# Patient Record
Sex: FEMALE | Race: WHITE | NOT HISPANIC OR LATINO | Employment: UNEMPLOYED | URBAN - METROPOLITAN AREA
[De-identification: names, ages, dates, MRNs, and addresses within clinical notes are randomized per-mention and may not be internally consistent; named-entity substitution may affect disease eponyms.]

---
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
## 2024-01-15 ENCOUNTER — REFERRAL TRIAGE (OUTPATIENT)
Dept: LABOR AND DELIVERY | Facility: HOSPITAL | Age: 19
End: 2024-01-15
Payer: MEDICAID

## 2024-01-15 ENCOUNTER — PATIENT OUTREACH (OUTPATIENT)
Dept: LABOR AND DELIVERY | Facility: HOSPITAL | Age: 19
End: 2024-01-15
Payer: MEDICAID

## 2024-01-16 ENCOUNTER — PATIENT OUTREACH (OUTPATIENT)
Dept: LABOR AND DELIVERY | Facility: HOSPITAL | Age: 19
End: 2024-01-16
Payer: MEDICAID

## 2024-01-16 NOTE — OUTREACH NOTE
[facility]  Unable to Reach       Questions/Answers      Flowsheet Row Responses   Call Attempt Second   Outcome Left message, MyChart message sent to patient   Next Call Attempt Date 01/17/24                Cinthya Burton RN  Maternity Nurse Navigator    1/16/2024, 18:32 EST

## 2024-01-17 ENCOUNTER — PATIENT OUTREACH (OUTPATIENT)
Dept: LABOR AND DELIVERY | Facility: HOSPITAL | Age: 19
End: 2024-01-17
Payer: MEDICAID

## 2024-01-17 NOTE — OUTREACH NOTE
[facility]  Intake    Current Estimated Gestational Age: 32w1d    Intake Assessment      Flowsheet Row Responses   Best Method for Contacting Cell   Currently Employed No  [was working seasonal at [employer] but layed off and now staying home with 2 yr old]   Do you have a dentist? No  [discussed calling insurance for dental providers and calling to make healthy check up]   Resources Presently Utilizing: None   Maternal Warning Signs Provided  [sent in [employer]]   Other: Provided  [sent in [employer]]   Other Education How to find a dentist, Insurance benefits/Incentives, Smoking/Vaping Cessation, WIC Benefits, SNAP Benefits            Learning Assessment      Flowsheet Row Responses   Relationship Patient   Learner Name Tracy   Does the learner have any barriers to learning? No Barriers   What is the preferred language of the learner for medical teaching? English   Is an  required? No   How does the learner prefer to learn new concepts? Listening, Reading, Demonstration, Pictures/Video        Info sent for smoking cessation, she reports she has cut back to 3 cig/day    Tobacco, Alcohol, and Drug History     reports that she has been smoking cigarettes. She has a 1.25 pack-year smoking history. She has never been exposed to tobacco smoke. She has never used smokeless tobacco.   reports that she does not currently use alcohol.   reports that she does not currently use drugs after having used the following drugs: Marijuana.    Cinthya Burton RN  Maternity Nurse Navigator    1/17/2024, 12:01 EST

## 2024-01-17 NOTE — OUTREACH NOTE
[facility]  Enrollment    Current Estimated Gestational Age: 32w1d    Questions/Answers      Flowsheet Row Responses   Would like to participate? Yes   Date of Intake Visit 01/17/24            Contact info provided    Cinthya Burton RN  Maternity Nurse Navigator    1/17/2024, 12:03 EST

## 2024-02-15 ENCOUNTER — PATIENT OUTREACH (OUTPATIENT)
Dept: LABOR AND DELIVERY | Facility: HOSPITAL | Age: 19
End: 2024-02-15
Payer: MEDICAID

## 2024-03-19 ENCOUNTER — PATIENT OUTREACH (OUTPATIENT)
Dept: LABOR AND DELIVERY | Facility: HOSPITAL | Age: 19
End: 2024-03-19
Payer: MEDICAID

## 2024-03-19 NOTE — OUTREACH NOTE
[facility]  Unable to Reach       Questions/Answers      Flowsheet Row Responses   Pending Outreach Prenatal Check-in   Call Attempt First   Outcome Left message, [employer] message sent to patient                Cinthya Burton RN  Maternity Nurse Navigator    3/19/2024, 15:59 EDT

## 2024-03-25 ENCOUNTER — PATIENT OUTREACH (OUTPATIENT)
Dept: LABOR AND DELIVERY | Facility: HOSPITAL | Age: 19
End: 2024-03-25
Payer: MEDICAID

## 2024-03-25 NOTE — OUTREACH NOTE
[facility]  Unable to Reach       Questions/Answers      Flowsheet Row Responses   Pending Outreach Prenatal Check-in   Call Attempt Second   Outcome No answer/busy, MyChart message sent to patient   Unable to reach comments: mailbox full unable to leave voicemail            Contacted Dr Lomax office if patient had transferred, no notes of delivery and unable to reach patient.    Isha at office reports patient last seen in office 2/15/24    Cinthya Burton RN  Maternity Nurse Navigator    3/25/2024, 14:51 EDT

## 2024-04-10 ENCOUNTER — PATIENT OUTREACH (OUTPATIENT)
Dept: LABOR AND DELIVERY | Facility: HOSPITAL | Age: 19
End: 2024-04-10
Payer: MEDICAID

## 2024-04-10 NOTE — OUTREACH NOTE
[facility]  Unable to Reach       Questions/Answers      Flowsheet Row Responses   Pending Outreach Postpartum Check-in   Call Attempt Third   Outcome Left message, MyChart message sent to patient            Unable to reach patient, no delivery note in chart.      Cinthya Burton RN  Maternity Nurse Navigator    4/10/2024, 13:35 EDT

## 2024-08-19 ENCOUNTER — HOSPITAL ENCOUNTER (EMERGENCY)
Facility: HOSPITAL | Age: 19
Discharge: HOME | End: 2024-08-19
Attending: EMERGENCY MEDICINE
Payer: MEDICAID

## 2024-08-19 ENCOUNTER — APPOINTMENT (OUTPATIENT)
Dept: ULTRASOUND IMAGING | Facility: HOSPITAL | Age: 19
End: 2024-08-19
Payer: MEDICAID

## 2024-08-19 VITALS
HEART RATE: 102 BPM | TEMPERATURE: 97.8 F | DIASTOLIC BLOOD PRESSURE: 87 MMHG | OXYGEN SATURATION: 98 % | SYSTOLIC BLOOD PRESSURE: 147 MMHG | WEIGHT: 203 LBS | BODY MASS INDEX: 34.66 KG/M2 | RESPIRATION RATE: 17 BRPM | HEIGHT: 64 IN

## 2024-08-19 DIAGNOSIS — N93.9 ABNORMAL VAGINAL BLEEDING: Primary | ICD-10-CM

## 2024-08-19 LAB
B-HCG UR QL: NEGATIVE
BASOPHILS # BLD AUTO: 0.04 10*3/MM3 (ref 0–0.2)
BASOPHILS NFR BLD AUTO: 0.3 % (ref 0–1.5)
BILIRUB UR QL STRIP: NEGATIVE
CLARITY UR: ABNORMAL
COLOR UR: ABNORMAL
DEPRECATED RDW RBC AUTO: 60.2 FL (ref 37–54)
EOSINOPHIL # BLD AUTO: 0.11 10*3/MM3 (ref 0–0.4)
EOSINOPHIL NFR BLD AUTO: 0.9 % (ref 0.3–6.2)
ERYTHROCYTE [DISTWIDTH] IN BLOOD BY AUTOMATED COUNT: 17.7 % (ref 12.3–15.4)
GLUCOSE UR STRIP-MCNC: NEGATIVE MG/DL
HCT VFR BLD AUTO: 40.1 % (ref 34–46.6)
HGB BLD-MCNC: 12.2 G/DL (ref 12–15.9)
HGB UR QL STRIP.AUTO: ABNORMAL
IMM GRANULOCYTES # BLD AUTO: 0.03 10*3/MM3 (ref 0–0.05)
IMM GRANULOCYTES NFR BLD AUTO: 0.3 % (ref 0–0.5)
KETONES UR QL STRIP: ABNORMAL
LEUKOCYTE ESTERASE UR QL STRIP.AUTO: NEGATIVE
LYMPHOCYTES # BLD AUTO: 2.48 10*3/MM3 (ref 0.7–3.1)
LYMPHOCYTES NFR BLD AUTO: 20.8 % (ref 19.6–45.3)
MCH RBC QN AUTO: 28 PG (ref 26.6–33)
MCHC RBC AUTO-ENTMCNC: 30.4 G/DL (ref 31.5–35.7)
MCV RBC AUTO: 92 FL (ref 79–97)
MONOCYTES # BLD AUTO: 0.75 10*3/MM3 (ref 0.1–0.9)
MONOCYTES NFR BLD AUTO: 6.3 % (ref 5–12)
NEUTROPHILS NFR BLD AUTO: 71.4 % (ref 42.7–76)
NEUTROPHILS NFR BLD AUTO: 8.51 10*3/MM3 (ref 1.7–7)
NITRITE UR QL STRIP: NEGATIVE
PH UR STRIP.AUTO: 5.5 [PH] (ref 5–8)
PLATELET # BLD AUTO: 358 10*3/MM3 (ref 140–450)
PMV BLD AUTO: 8.3 FL (ref 6–12)
PROT UR QL STRIP: ABNORMAL
RBC # BLD AUTO: 4.36 10*6/MM3 (ref 3.77–5.28)
SP GR UR STRIP: >=1.03 (ref 1–1.03)
UROBILINOGEN UR QL STRIP: ABNORMAL
WBC NRBC COR # BLD AUTO: 11.92 10*3/MM3 (ref 3.4–10.8)

## 2024-08-19 PROCEDURE — 81025 URINE PREGNANCY TEST: CPT | Performed by: EMERGENCY MEDICINE

## 2024-08-19 PROCEDURE — 99283 EMERGENCY DEPT VISIT LOW MDM: CPT

## 2024-08-19 PROCEDURE — 87798 DETECT AGENT NOS DNA AMP: CPT

## 2024-08-19 PROCEDURE — 81003 URINALYSIS AUTO W/O SCOPE: CPT | Performed by: EMERGENCY MEDICINE

## 2024-08-19 PROCEDURE — 87801 DETECT AGNT MULT DNA AMPLI: CPT

## 2024-08-19 PROCEDURE — 76830 TRANSVAGINAL US NON-OB: CPT

## 2024-08-19 PROCEDURE — 87491 CHLMYD TRACH DNA AMP PROBE: CPT

## 2024-08-19 PROCEDURE — 99284 EMERGENCY DEPT VISIT MOD MDM: CPT

## 2024-08-19 PROCEDURE — 93976 VASCULAR STUDY: CPT

## 2024-08-19 PROCEDURE — 85025 COMPLETE CBC W/AUTO DIFF WBC: CPT

## 2024-08-19 PROCEDURE — 87661 TRICHOMONAS VAGINALIS AMPLIF: CPT

## 2024-08-19 PROCEDURE — 87591 N.GONORRHOEAE DNA AMP PROB: CPT

## 2024-08-19 PROCEDURE — 80053 COMPREHEN METABOLIC PANEL: CPT

## 2024-08-19 RX ORDER — SODIUM CHLORIDE 0.9 % (FLUSH) 0.9 %
10 SYRINGE (ML) INJECTION AS NEEDED
Status: DISCONTINUED | OUTPATIENT
Start: 2024-08-19 | End: 2024-08-19 | Stop reason: HOSPADM

## 2024-08-20 LAB
ALBUMIN SERPL-MCNC: 4.3 G/DL (ref 3.5–5.2)
ALBUMIN/GLOB SERPL: 1.4 G/DL
ALP SERPL-CCNC: 126 U/L (ref 39–117)
ALT SERPL W P-5'-P-CCNC: 13 U/L (ref 1–33)
ANION GAP SERPL CALCULATED.3IONS-SCNC: 10.8 MMOL/L (ref 5–15)
AST SERPL-CCNC: 14 U/L (ref 1–32)
BILIRUB SERPL-MCNC: 0.4 MG/DL (ref 0–1.2)
BUN SERPL-MCNC: 11 MG/DL (ref 6–20)
BUN/CREAT SERPL: 13.9 (ref 7–25)
CALCIUM SPEC-SCNC: 9.4 MG/DL (ref 8.6–10.5)
CHLORIDE SERPL-SCNC: 103 MMOL/L (ref 98–107)
CO2 SERPL-SCNC: 21.2 MMOL/L (ref 22–29)
CREAT SERPL-MCNC: 0.79 MG/DL (ref 0.57–1)
EGFRCR SERPLBLD CKD-EPI 2021: 109.3 ML/MIN/1.73
GLOBULIN UR ELPH-MCNC: 3 GM/DL
GLUCOSE SERPL-MCNC: 92 MG/DL (ref 65–99)
POTASSIUM SERPL-SCNC: 3.6 MMOL/L (ref 3.5–5.2)
PROT SERPL-MCNC: 7.3 G/DL (ref 6–8.5)
SODIUM SERPL-SCNC: 135 MMOL/L (ref 136–145)

## 2024-08-20 NOTE — FSED PROVIDER NOTE
[facility] ED / URGENT CARE    EMERGENCY DEPARTMENT ENCOUNTER    Room Number:  04/04  Date seen:  8/19/2024  Time seen: 20:31 EDT  PCP: Provider, No Known  Historian: Patient    HPI:  Chief complaint: Vaginal bleeding  Context:Tracy Carrington is a 19 y.o. female who presents to the ED with c/o vaginal bleeding.  Patient reports that she has been having vaginal bleeding over the last 6 days.  She reports that she had a normal period at the beginning of the month and then stop bleeding for 3 days.  She reports that she has been passing small size clots as well.  Patient denies any birth control use or concern for pregnancy.  Patient is nontoxic in appearance.  She denies any fever, urinary symptoms.  Patient denies any abdominal pain.    Timing: Constant  Duration: 6 days  Intensity/Severity: Moderate  Associated Symptoms: Vaginal bleeding  Aggravating Factors: No known aggravating  Alleviating Factors: No known alleviating      MEDICAL RECORD REVIEW  Seasonal allergies, anxiety, depression, ADHD, asthma    ALLERGIES  Dairy aid  [tilactase]    PAST MEDICAL HISTORY  Active Ambulatory Problems     Diagnosis Date Noted    Sprain of unspecified ligament of right ankle, initial encounter 08/20/2018    Unspecified sprain of right foot, initial encounter 11/29/2018    Adverse effect of unspecified drugs, medicaments and biological substances, initial encounter 04/25/2018    Arthralgia 12/31/2018    Bitten by dog 04/26/2018    Abdominal pain 01/24/2018    Chest pain 10/06/2017    Conjunctivitis 04/23/2012    Contact dermatitis 09/07/2017    Diarrhea 03/07/2018    Dizziness 05/14/2013    Vertigo 11/22/2017    Elevated blood pressure reading without diagnosis of hypertension 09/19/2017    Encounter for lipid screening for cardiovascular disease 11/22/2017    Encounter for childhood immunizations appropriate for age 02/03/2017    Exercise-induced asthma 03/12/2019    Family history of diabetes mellitus  05/14/2013    Foot pain, right 08/12/2018    Genital herpes simplex 04/06/2019    Hay fever 04/23/2012    Heart murmur 11/14/2017    Knee pain, bilateral 04/30/2019    Laceration without foreign body of left hand, initial encounter 04/26/2018    Menorrhagia 03/26/2018    Menstrual irregularity 05/22/2019    Mixed anxiety depressive disorder 03/26/2018    Anxiety disorder 03/26/2018    Depression 03/26/2018    Nausea 09/19/2017    Overweight 01/30/2018    Viral intestinal infection 09/10/2018    Memory loss 11/09/2022    Closed head injury with concussion 11/09/2022    Attention deficit hyperactivity disorder (ADHD), combined type 11/09/2022     Resolved Ambulatory Problems     Diagnosis Date Noted    No Resolved Ambulatory Problems     Past Medical History:   Diagnosis Date    ADHD (attention deficit hyperactivity disorder)     Asthma     Seasonal allergies        PAST SURGICAL HISTORY  Past Surgical History:   Procedure Laterality Date    EAR TUBES      ENDOSCOPY      TONSILLECTOMY         FAMILY HISTORY  Family History   Problem Relation Age of Onset    Diabetes Mother     Hypertension Mother     Diabetes Father     Hypertension Father     Diabetes Maternal Grandmother     Hypertension Maternal Grandmother     Diabetes Paternal Grandmother     Hypertension Paternal Grandfather     Leukemia Paternal Grandfather        SOCIAL HISTORY  Social History     Socioeconomic History    Marital status: Significant Other     Spouse name: Alejandro Faustin    Number of children: 1   Tobacco Use    Smoking status: Every Day     Current packs/day: 0.25     Average packs/day: 0.3 packs/day for 5.0 years (1.3 ttl pk-yrs)     Types: Cigarettes     Passive exposure: Never    Smokeless tobacco: Never    Tobacco comments:     Quit E Cig- middle of 2019/ Smokes 3 cigs per day   Vaping Use    Vaping status: Former   Substance and Sexual Activity    Alcohol use: Not Currently    Drug use: Not Currently     Types: Marijuana     Comment:  quit with +UPT    Sexual activity: Defer       REVIEW OF SYSTEMS  Review of Systems    All systems reviewed and negative except for those discussed in HPI.     PHYSICAL EXAM    I have reviewed the triage vital signs and nursing notes.    ED Triage Vitals   Temp Heart Rate Resp BP SpO2   08/19/24 1752 08/19/24 1752 08/19/24 1752 08/19/24 1752 08/19/24 1752   97.8 °F (36.6 °C) 84 16 (!) 175/108 99 %      Temp src Heart Rate Source Patient Position BP Location FiO2 (%)   -- -- 08/19/24 1932 08/19/24 1932 --     Lying Left arm        Physical Exam  Exam conducted with a chaperone present.   Constitutional:       Appearance: Normal appearance. She is not toxic-appearing.   HENT:      Nose: Nose normal.      Mouth/Throat:      Mouth: Mucous membranes are moist.   Eyes:      Pupils: Pupils are equal, round, and reactive to light.   Cardiovascular:      Rate and Rhythm: Normal rate and regular rhythm.      Pulses: Normal pulses.      Heart sounds: Normal heart sounds.   Pulmonary:      Effort: Pulmonary effort is normal.      Breath sounds: Normal breath sounds.   Abdominal:      General: Bowel sounds are normal.      Palpations: Abdomen is soft.      Tenderness: There is no abdominal tenderness. There is no guarding or rebound.   Genitourinary:     Vagina: Bleeding present.      Cervix: Normal.      Uterus: Normal.    Musculoskeletal:         General: Normal range of motion.   Skin:     General: Skin is warm.   Neurological:      General: No focal deficit present.      Mental Status: She is alert.   Psychiatric:         Mood and Affect: Mood normal.         Behavior: Behavior normal.         Vital signs and nursing notes reviewed.        LAB RESULTS  Recent Results (from the past 24 hour(s))   Pregnancy, Urine - Urine, Clean Catch    Collection Time: 08/19/24  7:10 PM    Specimen: Urine, Clean Catch   Result Value Ref Range    HCG, Urine QL Negative Negative   Urinalysis without microscopic (no culture) - Urine, Clean  Catch    Collection Time: 08/19/24  7:10 PM    Specimen: Urine, Clean Catch   Result Value Ref Range    Color, UA Orange (A) Yellow, Straw    Appearance, UA Cloudy (A) Clear    pH, UA 5.5 5.0 - 8.0    Specific Gravity, UA >=1.030 1.005 - 1.030    Glucose, UA Negative Negative    Ketones, UA Trace (A) Negative    Bilirubin, UA Negative Negative    Blood, UA Large (3+) (A) Negative    Protein, UA 30 mg/dL (1+) (A) Negative    Leuk Esterase, UA Negative Negative    Nitrite, UA Negative Negative    Urobilinogen, UA 0.2 E.U./dL 0.2 - 1.0 E.U./dL   Comprehensive Metabolic Panel    Collection Time: 08/19/24  7:17 PM    Specimen: Blood   Result Value Ref Range    Glucose 92 65 - 99 mg/dL    BUN 11 6 - 20 mg/dL    Creatinine 0.79 0.57 - 1.00 mg/dL    Sodium 135 (L) 136 - 145 mmol/L    Potassium 3.6 3.5 - 5.2 mmol/L    Chloride 103 98 - 107 mmol/L    CO2 18.9 (L) 22.0 - 29.0 mmol/L    Calcium 9.4 8.6 - 10.5 mg/dL    Total Protein 7.3 6.0 - 8.5 g/dL    Albumin 4.3 3.5 - 5.2 g/dL    ALT (SGPT) 13 1 - 33 U/L    AST (SGOT) 14 1 - 32 U/L    Alkaline Phosphatase 126 (H) 39 - 117 U/L    Total Bilirubin 0.4 0.0 - 1.2 mg/dL    Globulin 3.0 gm/dL    A/G Ratio 1.4 g/dL    BUN/Creatinine Ratio 13.9 7.0 - 25.0    Anion Gap 13.1 5.0 - 15.0 mmol/L    eGFR 109.3 >60.0 mL/min/1.73   CBC Auto Differential    Collection Time: 08/19/24  7:17 PM    Specimen: Blood   Result Value Ref Range    WBC 11.92 (H) 3.40 - 10.80 10*3/mm3    RBC 4.36 3.77 - 5.28 10*6/mm3    Hemoglobin 12.2 12.0 - 15.9 g/dL    Hematocrit 40.1 34.0 - 46.6 %    MCV 92.0 79.0 - 97.0 fL    MCH 28.0 26.6 - 33.0 pg    MCHC 30.4 (L) 31.5 - 35.7 g/dL    RDW 17.7 (H) 12.3 - 15.4 %    RDW-SD 60.2 (H) 37.0 - 54.0 fl    MPV 8.3 6.0 - 12.0 fL    Platelets 358 140 - 450 10*3/mm3    Neutrophil % 71.4 42.7 - 76.0 %    Lymphocyte % 20.8 19.6 - 45.3 %    Monocyte % 6.3 5.0 - 12.0 %    Eosinophil % 0.9 0.3 - 6.2 %    Basophil % 0.3 0.0 - 1.5 %    Immature Grans % 0.3 0.0 - 0.5 %    Neutrophils,  Absolute 8.51 (H) 1.70 - 7.00 10*3/mm3    Lymphocytes, Absolute 2.48 0.70 - 3.10 10*3/mm3    Monocytes, Absolute 0.75 0.10 - 0.90 10*3/mm3    Eosinophils, Absolute 0.11 0.00 - 0.40 10*3/mm3    Basophils, Absolute 0.04 0.00 - 0.20 10*3/mm3    Immature Grans, Absolute 0.03 0.00 - 0.05 10*3/mm3       Ordered the above labs and independently reviewed the results.      RADIOLOGY RESULTS  US Non-ob Transvaginal    Result Date: 8/19/2024  US NON-OB TRANSVAGINAL Date of Exam: 8/19/2024 7:55 PM EDT Indication: abnormal vaginal bleeding. Comparison: None for this pregnancy Technique: Transvaginal pelvic ultrasound was performed.  Grayscale and color Doppler imaging was utilized to evaluate the pelvic area with image documentation per protocol.  Doppler spectral analysis was performed. Findings: Uterus: A trace amount of fluid is noted within the endometrial canal/cervical canal. No gestational sac, yolk sac or fetal pole is identified on this study. The myometrial echotexture is unremarkable. No focal mass. The cervix is grossly unremarkable. The endometrium measures approximately 0.7 cm in thickness The right ovary is visualized on this study with color and Doppler normal flow. A 1.8 x 1.5 x 1.2 cm cyst is noted on the right ovary, possibly representing a follicle versus corpus luteum. The left ovary is not visible on this study.     Impression: No intrauterine pregnancy is identified on this study. A trace amount of fluid is noted within the endometrial canal. The right ovary is unremarkable. The left ovary is not visible on this study Electronically Signed: Colton Zambrano DO  8/19/2024 8:21 PM EDT  Workstation ID: [id number]        I ordered the above noted radiological studies. Independently reviewed by me and discussed with radiologist.  See dictation above for official radiology interpretation.      Orders placed during this visit:  Orders Placed This Encounter   Procedures    NuSwab VG+ - Swab, Vagina    US Non-ob  Transvaginal    Pregnancy, Urine - Urine, Clean Catch    Urinalysis without microscopic (no culture) - Urine, Clean Catch    Comprehensive Metabolic Panel    CBC Auto Differential    NPO Diet NPO Type: Strict NPO    Undress & Gown    Vital Signs    Orthostatic Blood Pressure    Oxygen Therapy- Nasal Cannula; Titrate 1-6 LPM Per SpO2; 90 - 95%    Insert Peripheral IV    CBC & Differential    ED Acknowledgement Form Needed;           PROCEDURES    Procedures        MEDICATIONS GIVEN IN ER    Medications   sodium chloride 0.9 % flush 10 mL (has no administration in time range)         PROGRESS, DATA ANALYSIS, CONSULTS, AND MEDICAL DECISION MAKING    All labs and radiology studies have been independently reviewed by me.          AS OF 21:52 EDT VITALS:    BP - 147/87  HR - 102  TEMP - 97.8 °F (36.6 °C)  02 SATS - 98%    Medical Decision Making  MEDICAL DECISION  Patient is a 19-year-old female who presents today with vaginal bleeding.  Patient had an IV established and blood requested time.  Patient presents with vaginal bleeding likely secondary to fibroids or other non-emergent cause of abnormal uterine bleeding such as anovulatory cycle. Based on history and exam workup patient's presentation not consistent with ectopic pregnancy, molar pregnancy, life-threatening coagulopathy, trauma, serious bacterial infection.  Patient's ultrasound was normal.  Pelvic exam was completed and patient did request STD testing to be added.  Recommend follow-up with her OB/GYN for continued evaluation.  She was given strict return precautions with understanding.        Problems Addressed:  Abnormal vaginal bleeding: complicated acute illness or injury    Amount and/or Complexity of Data Reviewed  Labs: ordered.  Radiology: ordered.    Risk  Prescription drug management.          DIAGNOSIS  Final diagnoses:   Abnormal vaginal bleeding       New Medications Ordered This Visit   Medications    sodium chloride 0.9 % flush 10 mL           I  performed hand hygiene on entry into the pt room and upon exit.      Part of this note may be an electronic transcription/translation of spoken language to printed text using the Dragon Dictation System.     Appropriate PPE worn during exam.    Dictated utilizing Dragon dictation     Note Disclaimer: At [facility], we believe that sharing information builds trust and better relationships. You are receiving this note because you recently visited [facility]. It is possible you will see health information before a provider has talked with you about it. This kind of information can be easy to misunderstand. To help you fully understand what it means for your health, we urge you to discuss this note with your provider.

## 2024-08-20 NOTE — DISCHARGE INSTRUCTIONS
Thank you for letting us care for you today.  Please follow-up with your primary care provider and your OB/GYN as previously discussed.  We will call you if any of your testing comes back as positive.  If you do not hear from us these results will be on your MyChart.  Make sure you are drinking plenty of fluids.  Return to the emergency room for any worsening vaginal bleeding, fever, pain or any other concerning symptoms.

## 2024-08-22 LAB
A VAGINAE DNA VAG QL NAA+PROBE: ABNORMAL SCORE
BVAB2 DNA VAG QL NAA+PROBE: ABNORMAL SCORE
C ALBICANS DNA VAG QL NAA+PROBE: NEGATIVE
C GLABRATA DNA VAG QL NAA+PROBE: NEGATIVE
C TRACH DNA SPEC QL NAA+PROBE: NEGATIVE
MEGA1 DNA VAG QL NAA+PROBE: ABNORMAL SCORE
N GONORRHOEA DNA VAG QL NAA+PROBE: NEGATIVE
T VAGINALIS DNA VAG QL NAA+PROBE: NEGATIVE

## 2024-08-23 RX ORDER — METRONIDAZOLE 500 MG/1
500 TABLET ORAL 2 TIMES DAILY
Qty: 14 TABLET | Refills: 0 | Status: SHIPPED | OUTPATIENT
Start: 2024-08-23 | End: 2024-08-30

## 2024-10-09 ENCOUNTER — HOSPITAL ENCOUNTER (OUTPATIENT)
Facility: HOSPITAL | Age: 19
Discharge: HOME | End: 2024-10-09
Attending: EMERGENCY MEDICINE | Admitting: EMERGENCY MEDICINE
Payer: MEDICAID

## 2024-10-09 VITALS
TEMPERATURE: 98.2 F | RESPIRATION RATE: 18 BRPM | HEART RATE: 110 BPM | WEIGHT: 200 LBS | SYSTOLIC BLOOD PRESSURE: 151 MMHG | OXYGEN SATURATION: 100 % | HEIGHT: 63 IN | DIASTOLIC BLOOD PRESSURE: 89 MMHG | BODY MASS INDEX: 35.44 KG/M2

## 2024-10-09 DIAGNOSIS — Z3A.01 LESS THAN 8 WEEKS GESTATION OF PREGNANCY: ICD-10-CM

## 2024-10-09 DIAGNOSIS — J02.0 STREP THROAT: Primary | ICD-10-CM

## 2024-10-09 LAB
B-HCG UR QL: POSITIVE
FLUAV SUBTYP SPEC NAA+PROBE: NOT DETECTED
FLUBV RNA ISLT QL NAA+PROBE: NOT DETECTED
SARS-COV-2 RNA RESP QL NAA+PROBE: NOT DETECTED
STREP A PCR: DETECTED

## 2024-10-09 PROCEDURE — 87651 STREP A DNA AMP PROBE: CPT | Performed by: EMERGENCY MEDICINE

## 2024-10-09 PROCEDURE — 87636 SARSCOV2 & INF A&B AMP PRB: CPT | Performed by: EMERGENCY MEDICINE

## 2024-10-09 PROCEDURE — 63710000001 ONDANSETRON ODT 4 MG TABLET DISPERSIBLE: Performed by: NURSE PRACTITIONER

## 2024-10-09 PROCEDURE — G0463 HOSPITAL OUTPT CLINIC VISIT: HCPCS | Performed by: NURSE PRACTITIONER

## 2024-10-09 PROCEDURE — 81025 URINE PREGNANCY TEST: CPT | Performed by: NURSE PRACTITIONER

## 2024-10-09 RX ORDER — AMOXICILLIN 500 MG/1
500 CAPSULE ORAL 3 TIMES DAILY
Qty: 30 CAPSULE | Refills: 0 | Status: SHIPPED | OUTPATIENT
Start: 2024-10-09 | End: 2024-10-19

## 2024-10-09 RX ORDER — ONDANSETRON 4 MG/1
4 TABLET, ORALLY DISINTEGRATING ORAL ONCE
Status: COMPLETED | OUTPATIENT
Start: 2024-10-09 | End: 2024-10-09

## 2024-10-09 RX ADMIN — ONDANSETRON 4 MG: 4 TABLET, ORALLY DISINTEGRATING ORAL at 10:25

## 2024-10-09 NOTE — FSED PROVIDER NOTE
Subjective   History of Present Illness  19-year-old female presents with nausea, cough and upper respiratory infection.  She reports her kids have URIs as well.  Last night she had a fever with vomiting and a cough.  Her fever broke this morning, the vomiting stopped, she reports she cannot get a deep breath.  She reports her last menstrual period was approximately a month ago and she is due to start any day.  She is a smoker but has not been smoking because it causes her to cough.  She did not take any medications for her fever last night.    History provided by:  Patient      Review of Systems   Constitutional:  Negative for fever.   HENT:  Positive for sore throat.    Respiratory:  Positive for cough and shortness of breath.    Cardiovascular:  Negative for chest pain.   Gastrointestinal:  Positive for nausea and vomiting. Negative for abdominal pain and diarrhea.   All other systems reviewed and are negative.      Past Medical History:   Diagnosis Date    ADHD (attention deficit hyperactivity disorder)     Anxiety disorder     Asthma     resolved    Depression     Seasonal allergies        Allergies   Allergen Reactions    Dairy Aid  [Tilactase] GI Intolerance       Past Surgical History:   Procedure Laterality Date    EAR TUBES      ENDOSCOPY      TONSILLECTOMY         Family History   Problem Relation Age of Onset    Diabetes Mother     Hypertension Mother     Diabetes Father     Hypertension Father     Diabetes Maternal Grandmother     Hypertension Maternal Grandmother     Diabetes Paternal Grandmother     Hypertension Paternal Grandfather     Leukemia Paternal Grandfather        Social History     Socioeconomic History    Marital status: Significant Other     Spouse name: Alejandro Faustin    Number of children: 1   Tobacco Use    Smoking status: Every Day     Current packs/day: 0.25     Average packs/day: 0.3 packs/day for 5.0 years (1.3 ttl pk-yrs)     Types: Cigarettes     Passive exposure: Never     Smokeless tobacco: Never    Tobacco comments:     Quit E Cig- middle of 2019/ Smokes 3 cigs per day   Vaping Use    Vaping status: Former   Substance and Sexual Activity    Alcohol use: Not Currently    Drug use: Not Currently     Types: Marijuana     Comment: quit with +UPT    Sexual activity: Defer           Objective   Physical Exam  Vitals and nursing note reviewed.   Constitutional:       Appearance: Normal appearance.   HENT:      Right Ear: Tympanic membrane, ear canal and external ear normal.      Left Ear: Tympanic membrane, ear canal and external ear normal.      Mouth/Throat:      Mouth: Mucous membranes are moist.      Pharynx: Oropharynx is clear. Posterior oropharyngeal erythema present.   Cardiovascular:      Rate and Rhythm: Normal rate and regular rhythm.      Heart sounds: Normal heart sounds.   Pulmonary:      Effort: Pulmonary effort is normal.      Breath sounds: Normal breath sounds.   Abdominal:      General: There is no distension.      Palpations: Abdomen is soft.      Tenderness: There is no abdominal tenderness.   Skin:     General: Skin is warm and dry.   Neurological:      Mental Status: She is alert and oriented to person, place, and time.   Psychiatric:         Mood and Affect: Mood normal.         Behavior: Behavior normal.         Procedures           ED Course  ED Course as of 10/09/24 1123   Wed Oct 09, 2024   1042 STREP A PCR(!): Detected [SJ]   1042 COVID19: Not Detected [SJ]   1042 Influenza A PCR: Not Detected []   1042 Influenza B PCR: Not Detected []   1114 HCG, Urine QL(!): Positive [SJ]      ED Course User Index  [SJ] Sonia Jones, RENETTA                                           Medical Decision Making  19-year-old female presents with nausea, cough and concern for upper respiratory infection.  Her kids have a URI.  Last night she had a fever vomiting cough sore throat.  Her fever broke this morning and the vomiting stopped but she feels she cannot catch a deep breath.   Her last period was approximately 1 month ago.  She reports she is due to start any day.  She is also smoker but has not been able to smoke due to it causing a cough.  She states that her cough triggered vomiting sometimes but also she had nausea without that.  Differential diagnoses include pneumonia, COVID, flu, strep, pregnancy.  My plan was to do a chest x-ray, I wanted to get a urine pregnancy test prior to that and also to make sure I was giving her an appropriate antibiotic if she was pregnant.  The patient's test did come back positive, I placed her on amoxicillin for the strep and will have her follow-up with her primary care and her OB/GYN.  Patient was encouraged to drink plenty of fluids and was given a work note for today.  She was advised to take Tylenol but not ibuprofen.    Problems Addressed:  Less than 8 weeks gestation of pregnancy: complicated acute illness or injury  Strep throat: complicated acute illness or injury    Amount and/or Complexity of Data Reviewed  Labs:  Decision-making details documented in ED Course.    Risk  Prescription drug management.        Final diagnoses:   Strep throat   Less than 8 weeks gestation of pregnancy       ED Disposition  ED Disposition       ED Disposition   Discharge    Condition   Stable    Comment   --               Provider, No Known  [facility] [place] [address]    Schedule an appointment as soon as possible for a visit   As needed, If symptoms worsen    PATIENT CONNECTION - [facility] [address] [phone]  Schedule an appointment as soon as possible for a visit   If symptoms worsen, As needed         Medication List        New Prescriptions      amoxicillin 500 MG capsule  Commonly known as: AMOXIL  Take 1 capsule by mouth 3 (Three) Times a Day for 10 days.               Where to Get Your Medications        These medications were sent to [facility] [id number] - [place], [place] - [address] [place] [address] -  [phone] [facility] [phone] FX  [address] [facility] [place] [address]      Phone: [phone]   amoxicillin 500 MG capsule

## 2024-10-09 NOTE — DISCHARGE INSTRUCTIONS
You have strep throat.  You are also pregnant.  Follow-up with your OB/GYN and take the antibiotics as directed and until gone.  Be sure to drink plenty of fluids and you may take Tylenol as needed for fever.  Avoid taking ibuprofen.    Return to the emergency department for worsening symptoms, development of vaginal bleeding, abdominal pain or worsening fever, shortness of breath or chest pain.

## 2024-10-09 NOTE — Clinical Note
[facility] [place] [address] [place] [address] [place] [address]  Phone: [phone]    Tracy Carrington was seen and treated in our emergency department on 10/9/2024.  She may return to work on 10/11/2024.         Thank you for choosing [facility].    Sonia Jones APRN

## 2024-12-17 LAB
EXTERNAL HEPATITIS B SURFACE ANTIGEN: NEGATIVE
EXTERNAL HEPATITIS C AB: NORMAL
TREPONEMA PALLIDUM IGG+IGM AB [PRESENCE] IN SERUM OR PLASMA BY IMMUNOASSAY: NEGATIVE

## 2025-02-06 ENCOUNTER — HOSPITAL ENCOUNTER (EMERGENCY)
Facility: HOSPITAL | Age: 20
Discharge: HOME | End: 2025-02-06
Attending: EMERGENCY MEDICINE
Payer: MEDICAID

## 2025-02-06 ENCOUNTER — APPOINTMENT (OUTPATIENT)
Dept: ULTRASOUND IMAGING | Facility: HOSPITAL | Age: 20
End: 2025-02-06
Payer: MEDICAID

## 2025-02-06 VITALS
OXYGEN SATURATION: 97 % | WEIGHT: 211.3 LBS | RESPIRATION RATE: 20 BRPM | TEMPERATURE: 98.3 F | HEIGHT: 63 IN | BODY MASS INDEX: 37.44 KG/M2 | SYSTOLIC BLOOD PRESSURE: 130 MMHG | HEART RATE: 82 BPM | DIASTOLIC BLOOD PRESSURE: 56 MMHG

## 2025-02-06 DIAGNOSIS — O46.90 VAGINAL BLEEDING IN PREGNANCY: Primary | ICD-10-CM

## 2025-02-06 LAB
ANION GAP SERPL CALCULATED.3IONS-SCNC: 10.9 MMOL/L (ref 5–15)
BASOPHILS # BLD AUTO: 0.02 10*3/MM3 (ref 0–0.2)
BASOPHILS NFR BLD AUTO: 0.1 % (ref 0–1.5)
BILIRUB UR QL STRIP: NEGATIVE
BUN SERPL-MCNC: 4 MG/DL (ref 6–20)
BUN/CREAT SERPL: 7.3 (ref 7–25)
CALCIUM SPEC-SCNC: 9.3 MG/DL (ref 8.6–10.5)
CHLORIDE SERPL-SCNC: 106 MMOL/L (ref 98–107)
CLARITY UR: ABNORMAL
CO2 SERPL-SCNC: 21.1 MMOL/L (ref 22–29)
COLOR UR: YELLOW
CREAT SERPL-MCNC: 0.55 MG/DL (ref 0.57–1)
DEPRECATED RDW RBC AUTO: 45.2 FL (ref 37–54)
EGFRCR SERPLBLD CKD-EPI 2021: 133.9 ML/MIN/1.73
EOSINOPHIL # BLD AUTO: 0.07 10*3/MM3 (ref 0–0.4)
EOSINOPHIL NFR BLD AUTO: 0.5 % (ref 0.3–6.2)
ERYTHROCYTE [DISTWIDTH] IN BLOOD BY AUTOMATED COUNT: 14 % (ref 12.3–15.4)
GLUCOSE SERPL-MCNC: 95 MG/DL (ref 65–99)
GLUCOSE UR STRIP-MCNC: NEGATIVE MG/DL
HCG INTACT+B SERPL-ACNC: 9941 MIU/ML
HCT VFR BLD AUTO: 31.6 % (ref 34–46.6)
HGB BLD-MCNC: 10.5 G/DL (ref 12–15.9)
HGB UR QL STRIP.AUTO: NEGATIVE
IMM GRANULOCYTES # BLD AUTO: 0.04 10*3/MM3 (ref 0–0.05)
IMM GRANULOCYTES NFR BLD AUTO: 0.3 % (ref 0–0.5)
KETONES UR QL STRIP: NEGATIVE
LEUKOCYTE ESTERASE UR QL STRIP.AUTO: ABNORMAL
LYMPHOCYTES # BLD AUTO: 2.25 10*3/MM3 (ref 0.7–3.1)
LYMPHOCYTES NFR BLD AUTO: 16.7 % (ref 19.6–45.3)
MCH RBC QN AUTO: 29.7 PG (ref 26.6–33)
MCHC RBC AUTO-ENTMCNC: 33.2 G/DL (ref 31.5–35.7)
MCV RBC AUTO: 89.5 FL (ref 79–97)
MONOCYTES # BLD AUTO: 0.64 10*3/MM3 (ref 0.1–0.9)
MONOCYTES NFR BLD AUTO: 4.7 % (ref 5–12)
NEUTROPHILS NFR BLD AUTO: 10.49 10*3/MM3 (ref 1.7–7)
NEUTROPHILS NFR BLD AUTO: 77.7 % (ref 42.7–76)
NITRITE UR QL STRIP: NEGATIVE
PH UR STRIP.AUTO: 5.5 [PH] (ref 5–8)
PLATELET # BLD AUTO: 262 10*3/MM3 (ref 140–450)
PMV BLD AUTO: 9 FL (ref 6–12)
POTASSIUM SERPL-SCNC: 3.2 MMOL/L (ref 3.5–5.2)
PROT UR QL STRIP: NEGATIVE
RBC # BLD AUTO: 3.53 10*6/MM3 (ref 3.77–5.28)
SODIUM SERPL-SCNC: 138 MMOL/L (ref 136–145)
SP GR UR STRIP: >=1.03 (ref 1–1.03)
UROBILINOGEN UR QL STRIP: ABNORMAL
WBC NRBC COR # BLD AUTO: 13.51 10*3/MM3 (ref 3.4–10.8)

## 2025-02-06 PROCEDURE — 99284 EMERGENCY DEPT VISIT MOD MDM: CPT

## 2025-02-06 PROCEDURE — 85025 COMPLETE CBC W/AUTO DIFF WBC: CPT | Performed by: EMERGENCY MEDICINE

## 2025-02-06 PROCEDURE — 36415 COLL VENOUS BLD VENIPUNCTURE: CPT

## 2025-02-06 PROCEDURE — 80048 BASIC METABOLIC PNL TOTAL CA: CPT | Performed by: EMERGENCY MEDICINE

## 2025-02-06 PROCEDURE — 81003 URINALYSIS AUTO W/O SCOPE: CPT

## 2025-02-06 PROCEDURE — 99283 EMERGENCY DEPT VISIT LOW MDM: CPT | Performed by: EMERGENCY MEDICINE

## 2025-02-06 PROCEDURE — 76810 OB US >/= 14 WKS ADDL FETUS: CPT

## 2025-02-06 PROCEDURE — 84702 CHORIONIC GONADOTROPIN TEST: CPT | Performed by: EMERGENCY MEDICINE

## 2025-02-06 NOTE — FSED PROVIDER NOTE
Subjective   History of Present Illness  19-year-old female at 21 weeks and 2 days presents complaining of slight spotting.  She says she saw her OB yesterday had a ultrasound that maybe showed abnormality of the baby's hand but otherwise normal as far she knows.  Started little bit of spotting while at work last night overnight and into this morning.  She says gone at this point.  No significant abdominal pain.  Review of Systems  As noted HPI  Past Medical History:   Diagnosis Date    ADHD (attention deficit hyperactivity disorder)     Anxiety disorder     Asthma     resolved    Depression     Seasonal allergies        Allergies   Allergen Reactions    Dairy Aid  [Tilactase] GI Intolerance       Past Surgical History:   Procedure Laterality Date    EAR TUBES      ENDOSCOPY      TONSILLECTOMY         Family History   Problem Relation Age of Onset    Diabetes Mother     Hypertension Mother     Diabetes Father     Hypertension Father     Diabetes Maternal Grandmother     Hypertension Maternal Grandmother     Diabetes Paternal Grandmother     Hypertension Paternal Grandfather     Leukemia Paternal Grandfather        Social History     Socioeconomic History    Marital status: Significant Other     Spouse name: Alejandro Faustin    Number of children: 1   Tobacco Use    Smoking status: Every Day     Current packs/day: 0.25     Average packs/day: 0.3 packs/day for 5.0 years (1.3 ttl pk-yrs)     Types: Cigarettes     Passive exposure: Never    Smokeless tobacco: Never    Tobacco comments:     Quit E Cig- middle of 2019/ Smokes 3 cigs per day   Vaping Use    Vaping status: Former   Substance and Sexual Activity    Alcohol use: Not Currently    Drug use: Not Currently     Types: Marijuana     Comment: quit with +UPT    Sexual activity: Defer           Objective   Physical Exam  Nursing notes reviewed.  INITIAL VITAL SIGNS: Reviewed by me.  Pulse ox normal  GENERAL: Alert. Well developed and well nourished. No respiratory  distress.  HEAD: Normocephalic.   EYES: No conjunctival injection.  ENT: Oral mucosa is moist.  NECK/BACK: Supple. Full range of motion.  RESPIRATORY: Non-labored respirations.  ABDOMEN: Soft nontender nondistended  EXTREMITIES: No deformity.  SKIN: Warm and dry. No rashes. No diaphoresis.  NEUROLOGIC: Alert. Normal gait    Procedures           ED Course  ED Course as of 02/06/25 1235   Thu Feb 06, 2025   0916 Patient with light spotting, no significant pain, looking at old records she is Rh positive would not need RhoGAM.  I will order labs and get ultrasound she says she isn't bleeding right now [RO]   1233 Labs are overall reassuring history UTI, ultrasound with good fetal heart rate no intrauterine hemorrhage, liver follow-up with your OB. [RO]      ED Course User Index  [RO] Leon Maloney MD                                           Medical Decision Making  Amount and/or Complexity of Data Reviewed  Labs: ordered.  Radiology: ordered.        Final diagnoses:   Vaginal bleeding in pregnancy       ED Disposition  ED Disposition       ED Disposition   Discharge    Condition   Good    Comment   --               Your OB    Call   To schedule follow-up appointment         Medication List      No changes were made to your prescriptions during this visit.

## 2025-02-06 NOTE — DISCHARGE INSTRUCTIONS
Follow-up with your obstetrician as discussed.  If you develop significant bleeding please go to a hospital-based emergency room where your OB practices.

## 2025-02-06 NOTE — Clinical Note
[facility] FSED [place] [address] [place] [address] [place] [address]  Phone: [phone]    Tracy Carrington was seen and treated in our emergency department on 2/6/2025.  She may return to work on 02/07/2025.         Thank you for choosing [facility].    Leon Maloney MD

## 2025-04-09 ENCOUNTER — APPOINTMENT (OUTPATIENT)
Dept: ULTRASOUND IMAGING | Facility: HOSPITAL | Age: 20
End: 2025-04-09
Payer: MEDICAID

## 2025-04-09 ENCOUNTER — HOSPITAL ENCOUNTER (OUTPATIENT)
Facility: HOSPITAL | Age: 20
Setting detail: OBSERVATION
Discharge: HOME | End: 2025-04-11
Attending: STUDENT IN AN ORGANIZED HEALTH CARE EDUCATION/TRAINING PROGRAM | Admitting: STUDENT IN AN ORGANIZED HEALTH CARE EDUCATION/TRAINING PROGRAM
Payer: MEDICAID

## 2025-04-09 PROBLEM — Z34.90 PREGNANT: Status: ACTIVE | Noted: 2025-04-09

## 2025-04-09 LAB
ALBUMIN SERPL-MCNC: 3.7 G/DL (ref 3.5–5.2)
ALBUMIN/GLOB SERPL: 1.3 G/DL
ALP SERPL-CCNC: 104 U/L (ref 39–117)
ALT SERPL W P-5'-P-CCNC: 68 U/L (ref 1–33)
ANION GAP SERPL CALCULATED.3IONS-SCNC: 14.4 MMOL/L (ref 5–15)
AST SERPL-CCNC: 43 U/L (ref 1–32)
BACTERIA UR QL AUTO: ABNORMAL /HPF
BILIRUB SERPL-MCNC: 0.7 MG/DL (ref 0–1.2)
BILIRUB UR QL STRIP: ABNORMAL
BUN SERPL-MCNC: 4 MG/DL (ref 6–20)
BUN/CREAT SERPL: 8 (ref 7–25)
CALCIUM SPEC-SCNC: 8.8 MG/DL (ref 8.6–10.5)
CHLORIDE SERPL-SCNC: 105 MMOL/L (ref 98–107)
CLARITY UR: ABNORMAL
CO2 SERPL-SCNC: 18.6 MMOL/L (ref 22–29)
COLOR UR: ABNORMAL
CREAT SERPL-MCNC: 0.5 MG/DL (ref 0.57–1)
DEPRECATED RDW RBC AUTO: 44.4 FL (ref 37–54)
EGFRCR SERPLBLD CKD-EPI 2021: 136.2 ML/MIN/1.73
ERYTHROCYTE [DISTWIDTH] IN BLOOD BY AUTOMATED COUNT: 14.2 % (ref 12.3–15.4)
GLOBULIN UR ELPH-MCNC: 2.8 GM/DL
GLUCOSE SERPL-MCNC: 70 MG/DL (ref 65–99)
GLUCOSE UR STRIP-MCNC: NEGATIVE MG/DL
HCT VFR BLD AUTO: 33.6 % (ref 34–46.6)
HGB BLD-MCNC: 10.8 G/DL (ref 12–15.9)
HGB UR QL STRIP.AUTO: NEGATIVE
HYALINE CASTS UR QL AUTO: ABNORMAL /LPF
KETONES UR QL STRIP: ABNORMAL
LEUKOCYTE ESTERASE UR QL STRIP.AUTO: ABNORMAL
MAGNESIUM SERPL-MCNC: 1.9 MG/DL (ref 1.6–2.6)
MCH RBC QN AUTO: 28 PG (ref 26.6–33)
MCHC RBC AUTO-ENTMCNC: 32.1 G/DL (ref 31.5–35.7)
MCV RBC AUTO: 87 FL (ref 79–97)
MUCOUS THREADS URNS QL MICRO: ABNORMAL /HPF
NITRITE UR QL STRIP: POSITIVE
PH UR STRIP.AUTO: 5.5 [PH] (ref 5–8)
PLATELET # BLD AUTO: 298 10*3/MM3 (ref 140–450)
PMV BLD AUTO: 9.7 FL (ref 6–12)
POTASSIUM SERPL-SCNC: 3.1 MMOL/L (ref 3.5–5.2)
PROT SERPL-MCNC: 6.5 G/DL (ref 6–8.5)
PROT UR QL STRIP: ABNORMAL
RBC # BLD AUTO: 3.86 10*6/MM3 (ref 3.77–5.28)
RBC # UR STRIP: ABNORMAL /HPF
REF LAB TEST METHOD: ABNORMAL
SODIUM SERPL-SCNC: 138 MMOL/L (ref 136–145)
SP GR UR STRIP: 1.03 (ref 1–1.03)
SQUAMOUS #/AREA URNS HPF: ABNORMAL /HPF
UROBILINOGEN UR QL STRIP: ABNORMAL
WBC # UR STRIP: ABNORMAL /HPF
WBC NRBC COR # BLD AUTO: 9.32 10*3/MM3 (ref 3.4–10.8)

## 2025-04-09 PROCEDURE — 96375 TX/PRO/DX INJ NEW DRUG ADDON: CPT

## 2025-04-09 PROCEDURE — 87086 URINE CULTURE/COLONY COUNT: CPT | Performed by: STUDENT IN AN ORGANIZED HEALTH CARE EDUCATION/TRAINING PROGRAM

## 2025-04-09 PROCEDURE — 81001 URINALYSIS AUTO W/SCOPE: CPT | Performed by: STUDENT IN AN ORGANIZED HEALTH CARE EDUCATION/TRAINING PROGRAM

## 2025-04-09 PROCEDURE — 25810000003 LACTATED RINGERS SOLUTION: Performed by: STUDENT IN AN ORGANIZED HEALTH CARE EDUCATION/TRAINING PROGRAM

## 2025-04-09 PROCEDURE — G0379 DIRECT REFER HOSPITAL OBSERV: HCPCS

## 2025-04-09 PROCEDURE — 93005 ELECTROCARDIOGRAM TRACING: CPT | Performed by: STUDENT IN AN ORGANIZED HEALTH CARE EDUCATION/TRAINING PROGRAM

## 2025-04-09 PROCEDURE — G0378 HOSPITAL OBSERVATION PER HR: HCPCS

## 2025-04-09 PROCEDURE — 76705 ECHO EXAM OF ABDOMEN: CPT

## 2025-04-09 PROCEDURE — 80053 COMPREHEN METABOLIC PANEL: CPT | Performed by: STUDENT IN AN ORGANIZED HEALTH CARE EDUCATION/TRAINING PROGRAM

## 2025-04-09 PROCEDURE — 83735 ASSAY OF MAGNESIUM: CPT | Performed by: STUDENT IN AN ORGANIZED HEALTH CARE EDUCATION/TRAINING PROGRAM

## 2025-04-09 PROCEDURE — 93010 ELECTROCARDIOGRAM REPORT: CPT | Performed by: INTERNAL MEDICINE

## 2025-04-09 PROCEDURE — 25010000002 FAMOTIDINE 10 MG/ML SOLUTION: Performed by: STUDENT IN AN ORGANIZED HEALTH CARE EDUCATION/TRAINING PROGRAM

## 2025-04-09 PROCEDURE — 25810000003 LACTATED RINGERS PER 1000 ML: Performed by: STUDENT IN AN ORGANIZED HEALTH CARE EDUCATION/TRAINING PROGRAM

## 2025-04-09 PROCEDURE — 85027 COMPLETE CBC AUTOMATED: CPT | Performed by: STUDENT IN AN ORGANIZED HEALTH CARE EDUCATION/TRAINING PROGRAM

## 2025-04-09 PROCEDURE — 25010000002 ONDANSETRON PER 1 MG: Performed by: STUDENT IN AN ORGANIZED HEALTH CARE EDUCATION/TRAINING PROGRAM

## 2025-04-09 PROCEDURE — 25010000002 CEFTRIAXONE PER 250 MG: Performed by: STUDENT IN AN ORGANIZED HEALTH CARE EDUCATION/TRAINING PROGRAM

## 2025-04-09 RX ORDER — FAMOTIDINE 20 MG/1
20 TABLET, FILM COATED ORAL EVERY 12 HOURS PRN
Status: DISCONTINUED | OUTPATIENT
Start: 2025-04-09 | End: 2025-04-11 | Stop reason: HOSPADM

## 2025-04-09 RX ORDER — FAMOTIDINE 20 MG/1
20 TABLET, FILM COATED ORAL DAILY
COMMUNITY
End: 2025-04-11 | Stop reason: HOSPADM

## 2025-04-09 RX ORDER — ONDANSETRON 2 MG/ML
4 INJECTION INTRAMUSCULAR; INTRAVENOUS EVERY 6 HOURS PRN
Status: DISCONTINUED | OUTPATIENT
Start: 2025-04-09 | End: 2025-04-11 | Stop reason: HOSPADM

## 2025-04-09 RX ORDER — FAMOTIDINE 10 MG/ML
20 INJECTION, SOLUTION INTRAVENOUS ONCE
Status: COMPLETED | OUTPATIENT
Start: 2025-04-09 | End: 2025-04-09

## 2025-04-09 RX ORDER — PRENATAL VIT NO.126/IRON/FOLIC 28MG-0.8MG
1 TABLET ORAL DAILY
COMMUNITY

## 2025-04-09 RX ORDER — ONDANSETRON 2 MG/ML
4 INJECTION INTRAMUSCULAR; INTRAVENOUS ONCE
Status: COMPLETED | OUTPATIENT
Start: 2025-04-09 | End: 2025-04-09

## 2025-04-09 RX ORDER — METOCLOPRAMIDE 5 MG/1
5 TABLET ORAL EVERY 6 HOURS PRN
Status: DISCONTINUED | OUTPATIENT
Start: 2025-04-09 | End: 2025-04-11 | Stop reason: HOSPADM

## 2025-04-09 RX ORDER — ONDANSETRON 4 MG/1
4 TABLET, ORALLY DISINTEGRATING ORAL EVERY 4 HOURS PRN
Status: DISCONTINUED | OUTPATIENT
Start: 2025-04-09 | End: 2025-04-11 | Stop reason: HOSPADM

## 2025-04-09 RX ORDER — POTASSIUM CHLORIDE 1500 MG/1
40 TABLET, EXTENDED RELEASE ORAL ONCE
Status: COMPLETED | OUTPATIENT
Start: 2025-04-09 | End: 2025-04-09

## 2025-04-09 RX ORDER — SODIUM CHLORIDE, SODIUM LACTATE, POTASSIUM CHLORIDE, CALCIUM CHLORIDE 600; 310; 30; 20 MG/100ML; MG/100ML; MG/100ML; MG/100ML
100 INJECTION, SOLUTION INTRAVENOUS CONTINUOUS
Status: DISPENSED | OUTPATIENT
Start: 2025-04-09 | End: 2025-04-10

## 2025-04-09 RX ORDER — SODIUM CHLORIDE 0.9 % (FLUSH) 0.9 %
10 SYRINGE (ML) INJECTION AS NEEDED
Status: DISCONTINUED | OUTPATIENT
Start: 2025-04-09 | End: 2025-04-09 | Stop reason: HOSPADM

## 2025-04-09 RX ORDER — FAMOTIDINE 10 MG/ML
40 INJECTION, SOLUTION INTRAVENOUS EVERY 12 HOURS PRN
Status: DISCONTINUED | OUTPATIENT
Start: 2025-04-09 | End: 2025-04-11 | Stop reason: HOSPADM

## 2025-04-09 RX ORDER — SODIUM CHLORIDE 0.9 % (FLUSH) 0.9 %
10 SYRINGE (ML) INJECTION EVERY 12 HOURS SCHEDULED
Status: DISCONTINUED | OUTPATIENT
Start: 2025-04-09 | End: 2025-04-09 | Stop reason: HOSPADM

## 2025-04-09 RX ADMIN — SODIUM CHLORIDE, SODIUM LACTATE, POTASSIUM CHLORIDE, CALCIUM CHLORIDE 1000 ML: 20; 30; 600; 310 INJECTION, SOLUTION INTRAVENOUS at 17:20

## 2025-04-09 RX ADMIN — CEFTRIAXONE SODIUM 1000 MG: 1 INJECTION, POWDER, FOR SOLUTION INTRAMUSCULAR; INTRAVENOUS at 20:00

## 2025-04-09 RX ADMIN — SODIUM CHLORIDE, SODIUM LACTATE, POTASSIUM CHLORIDE, CALCIUM CHLORIDE 100 ML/HR: 20; 30; 600; 310 INJECTION, SOLUTION INTRAVENOUS at 21:56

## 2025-04-09 RX ADMIN — ONDANSETRON 4 MG: 2 INJECTION, SOLUTION INTRAMUSCULAR; INTRAVENOUS at 17:24

## 2025-04-09 RX ADMIN — FAMOTIDINE 20 MG: 10 INJECTION, SOLUTION INTRAVENOUS at 17:24

## 2025-04-09 RX ADMIN — POTASSIUM CHLORIDE 40 MEQ: 1500 TABLET, EXTENDED RELEASE ORAL at 19:59

## 2025-04-09 RX ADMIN — Medication 10 ML: at 20:21

## 2025-04-10 LAB
ALBUMIN SERPL-MCNC: 3.5 G/DL (ref 3.5–5.2)
ALBUMIN/GLOB SERPL: 1.3 G/DL
ALP SERPL-CCNC: 99 U/L (ref 39–117)
ALT SERPL W P-5'-P-CCNC: 83 U/L (ref 1–33)
ANION GAP SERPL CALCULATED.3IONS-SCNC: 12.6 MMOL/L (ref 5–15)
AST SERPL-CCNC: 48 U/L (ref 1–32)
BILIRUB SERPL-MCNC: 0.6 MG/DL (ref 0–1.2)
BUN SERPL-MCNC: 4 MG/DL (ref 6–20)
BUN/CREAT SERPL: 7.3 (ref 7–25)
CALCIUM SPEC-SCNC: 8.8 MG/DL (ref 8.6–10.5)
CHLORIDE SERPL-SCNC: 104 MMOL/L (ref 98–107)
CO2 SERPL-SCNC: 19.4 MMOL/L (ref 22–29)
CREAT SERPL-MCNC: 0.55 MG/DL (ref 0.57–1)
DEPRECATED RDW RBC AUTO: 44.9 FL (ref 37–54)
EGFRCR SERPLBLD CKD-EPI 2021: 133.1 ML/MIN/1.73
EOSINOPHIL # BLD MANUAL: 0.07 10*3/MM3 (ref 0–0.4)
EOSINOPHIL NFR BLD MANUAL: 1 % (ref 0.3–6.2)
ERYTHROCYTE [DISTWIDTH] IN BLOOD BY AUTOMATED COUNT: 14 % (ref 12.3–15.4)
GLOBULIN UR ELPH-MCNC: 2.6 GM/DL
GLUCOSE SERPL-MCNC: 77 MG/DL (ref 65–99)
HCT VFR BLD AUTO: 27.3 % (ref 34–46.6)
HGB BLD-MCNC: 8.7 G/DL (ref 12–15.9)
LYMPHOCYTES # BLD MANUAL: 2.11 10*3/MM3 (ref 0.7–3.1)
LYMPHOCYTES NFR BLD MANUAL: 2 % (ref 5–12)
MCH RBC QN AUTO: 28.2 PG (ref 26.6–33)
MCHC RBC AUTO-ENTMCNC: 31.9 G/DL (ref 31.5–35.7)
MCV RBC AUTO: 88.3 FL (ref 79–97)
METAMYELOCYTES NFR BLD MANUAL: 1 % (ref 0–0)
MONOCYTES # BLD: 0.15 10*3/MM3 (ref 0.1–0.9)
NEUTROPHILS # BLD AUTO: 4.88 10*3/MM3 (ref 1.7–7)
NEUTROPHILS NFR BLD MANUAL: 66 % (ref 42.7–76)
NEUTS BAND NFR BLD MANUAL: 1 % (ref 0–5)
PLAT MORPH BLD: NORMAL
PLATELET # BLD AUTO: 237 10*3/MM3 (ref 140–450)
PMV BLD AUTO: 9.4 FL (ref 6–12)
POTASSIUM SERPL-SCNC: 3.2 MMOL/L (ref 3.5–5.2)
POTASSIUM SERPL-SCNC: 3.6 MMOL/L (ref 3.5–5.2)
PROT SERPL-MCNC: 6.1 G/DL (ref 6–8.5)
RBC # BLD AUTO: 3.09 10*6/MM3 (ref 3.77–5.28)
RBC MORPH BLD: NORMAL
SODIUM SERPL-SCNC: 136 MMOL/L (ref 136–145)
VARIANT LYMPHS NFR BLD MANUAL: 1 % (ref 0–5)
VARIANT LYMPHS NFR BLD MANUAL: 28 % (ref 19.6–45.3)
WBC MORPH BLD: NORMAL
WBC NRBC COR # BLD AUTO: 7.29 10*3/MM3 (ref 3.4–10.8)

## 2025-04-10 PROCEDURE — 96376 TX/PRO/DX INJ SAME DRUG ADON: CPT

## 2025-04-10 PROCEDURE — 84132 ASSAY OF SERUM POTASSIUM: CPT | Performed by: STUDENT IN AN ORGANIZED HEALTH CARE EDUCATION/TRAINING PROGRAM

## 2025-04-10 PROCEDURE — 96361 HYDRATE IV INFUSION ADD-ON: CPT

## 2025-04-10 PROCEDURE — 85027 COMPLETE CBC AUTOMATED: CPT | Performed by: OBSTETRICS & GYNECOLOGY

## 2025-04-10 PROCEDURE — 25010000002 CEFTRIAXONE PER 250 MG: Performed by: STUDENT IN AN ORGANIZED HEALTH CARE EDUCATION/TRAINING PROGRAM

## 2025-04-10 PROCEDURE — 63710000001 ONDANSETRON ODT 4 MG TABLET DISPERSIBLE: Performed by: STUDENT IN AN ORGANIZED HEALTH CARE EDUCATION/TRAINING PROGRAM

## 2025-04-10 PROCEDURE — 59025 FETAL NON-STRESS TEST: CPT

## 2025-04-10 PROCEDURE — 25010000002 ONDANSETRON PER 1 MG: Performed by: STUDENT IN AN ORGANIZED HEALTH CARE EDUCATION/TRAINING PROGRAM

## 2025-04-10 PROCEDURE — G0378 HOSPITAL OBSERVATION PER HR: HCPCS

## 2025-04-10 PROCEDURE — 96365 THER/PROPH/DIAG IV INF INIT: CPT

## 2025-04-10 PROCEDURE — 85007 BL SMEAR W/DIFF WBC COUNT: CPT | Performed by: OBSTETRICS & GYNECOLOGY

## 2025-04-10 PROCEDURE — 25810000003 LACTATED RINGERS PER 1000 ML: Performed by: STUDENT IN AN ORGANIZED HEALTH CARE EDUCATION/TRAINING PROGRAM

## 2025-04-10 PROCEDURE — 80053 COMPREHEN METABOLIC PANEL: CPT | Performed by: STUDENT IN AN ORGANIZED HEALTH CARE EDUCATION/TRAINING PROGRAM

## 2025-04-10 RX ORDER — POTASSIUM CHLORIDE 1500 MG/1
20 TABLET, EXTENDED RELEASE ORAL ONCE
Status: COMPLETED | OUTPATIENT
Start: 2025-04-10 | End: 2025-04-10

## 2025-04-10 RX ADMIN — ONDANSETRON 4 MG: 2 INJECTION, SOLUTION INTRAMUSCULAR; INTRAVENOUS at 00:10

## 2025-04-10 RX ADMIN — ONDANSETRON 4 MG: 4 TABLET, ORALLY DISINTEGRATING ORAL at 22:59

## 2025-04-10 RX ADMIN — POTASSIUM CHLORIDE 20 MEQ: 1500 TABLET, EXTENDED RELEASE ORAL at 11:31

## 2025-04-10 RX ADMIN — SODIUM CHLORIDE, SODIUM LACTATE, POTASSIUM CHLORIDE, CALCIUM CHLORIDE 100 ML/HR: 20; 30; 600; 310 INJECTION, SOLUTION INTRAVENOUS at 20:57

## 2025-04-10 RX ADMIN — CEFTRIAXONE SODIUM 1000 MG: 1 INJECTION, POWDER, FOR SOLUTION INTRAMUSCULAR; INTRAVENOUS at 20:15

## 2025-04-10 NOTE — PLAN OF CARE
Goal Outcome Evaluation:  Pt complained of dizziness and nausea at the beginning of shift, dizziness and nausea resolved. Pt states she is feeling better at this time.   Problem: Adult Inpatient Plan of Care  Goal: Plan of Care Review  4/10/2025 0544 by Juli Balderrama RN  Outcome: Progressing  4/10/2025 0544 by Juli Balderrama RN  Outcome: Progressing  Goal: Patient-Specific Goal (Individualized)  4/10/2025 0544 by Juli Balderrama RN  Outcome: Progressing  4/10/2025 0544 by Juli Balderrama RN  Outcome: Progressing  Goal: Absence of Hospital-Acquired Illness or Injury  4/10/2025 0544 by Juli Balderrama RN  Outcome: Progressing  4/10/2025 0544 by Juli Balderrama RN  Outcome: Progressing  Intervention: Identify and Manage Fall Risk  Recent Flowsheet Documentation  Taken 4/10/2025 0515 by Juli Balderrama RN  Safety Promotion/Fall Prevention: safety round/check completed  Taken 4/10/2025 0329 by Juli Balderrama RN  Safety Promotion/Fall Prevention: safety round/check completed  Taken 4/10/2025 0155 by Juli Balderrama RN  Safety Promotion/Fall Prevention: safety round/check completed  Taken 4/10/2025 0000 by Juli Balderrama RN  Safety Promotion/Fall Prevention: safety round/check completed  Taken 4/9/2025 2200 by Juli Balderrama RN  Safety Promotion/Fall Prevention: safety round/check completed  Intervention: Prevent Skin Injury  Recent Flowsheet Documentation  Taken 4/10/2025 0515 by Juli Balderrama RN  Body Position: sitting up in bed  Taken 4/10/2025 0329 by Juli Balderrama RN  Body Position:   left   side-lying  Taken 4/10/2025 0155 by Juli Balderrama RN  Body Position:   side-lying   right  Taken 4/10/2025 0000 by Juli Balderrama RN  Body Position: sitting up in bed  Taken 4/9/2025 2200 by Juli Balderrama RN  Body Position: sitting up in bed  Intervention: Prevent and Manage VTE (Venous Thromboembolism) Risk  Recent Flowsheet Documentation  Taken 4/9/2025 2200 by Juli Balderrama RN  VTE Prevention/Management: (pt up ad linda) other  (see comments)  Goal: Optimal Comfort and Wellbeing  4/10/2025 0544 by Juli Balderrama RN  Outcome: Progressing  4/10/2025 0544 by Juli Balderrama RN  Outcome: Progressing  Goal: Readiness for Transition of Care  4/10/2025 0544 by Juli Balderrama RN  Outcome: Progressing  4/10/2025 0544 by Juli Balderrama RN  Outcome: Progressing     Problem: Nausea and Vomiting  Goal: Nausea and Vomiting Relief  4/10/2025 0544 by Juli Balderrama RN  Outcome: Progressing  4/10/2025 0544 by Juli Balderrama RN  Outcome: Progressing     Problem: Electrolyte Imbalance  Goal: Electrolyte Balance  4/10/2025 0544 by Juli Balderrama RN  Outcome: Progressing  4/10/2025 0544 by Juli Balderrama RN  Outcome: Progressing

## 2025-04-10 NOTE — H&P
JORDI Rich  Obstetric History and Physical     Chief Complaint: nausea and vomiting    Subjective     Patient is a 20 y.o. female  currently at 26w3d, who presents from clinic with nausea and vomiting. Has been unable to tolerate food and fluids with po meds. Denies fever, syncope, palpitations, or urinary sx. Pt was having irregular cramping yesterday but denies lof or vaginal bleeding. Labs suggested hypokalemia, UTI, and dehydration. Zofran, reglan, and pepcid prn ordered. K+ replaced per protocol. EKG ordered and given 1gm IV Rocephin for her UTI since she is unable to cory po. IVF and RUQ u/s ordered. Fetal monitoring, RNST. GI consulted.    Her prenatal care is benign.  Her previous obstetric/gynecological history is noted for is non-contributory.      Prenatal Information:  Prenatal Results       Initial Prenatal Labs       Test Value Reference Range Date Time    Hemoglobin  10.5 g/dL 12.0 - 15.9 25 0929    Hematocrit  31.6 % 34.0 - 46.6 25 0929    Platelets  262 10*3/mm3 140 - 450 25 0929    Rubella IgG        Hepatitis B SAg        Hepatitis C Ab        RPR        T. Pallidum Ab         ABO ^ O   23     Rh ^ Positive   23     Antibody Screen        HIV        Urine Culture        Gonorrhea        Chlamydia        TSH        HgB A1c         Varicella IgG        Hemoglobinopathy Fractionation        Hemoglobinopathy (genetic testing)        Cystic fibrosis         Spinal muscular atrophy        Fragile X                  Fetal testing        Test Value Reference Range Date Time    NIPT        MSAFP        AFP-4                  2nd and 3rd Trimester       Test Value Reference Range Date Time    Hemoglobin (repeated)  8.7 g/dL 12.0 - 15.9 04/10/25 0529       10.8 g/dL 12.0 - 15.9 25 1716    Hematocrit (repeated)  27.3 % 34.0 - 46.6 04/10/25 0529       33.6 % 34.0 - 46.6 25 1716    Platelets   237 10*3/mm3 140 - 450 04/10/25 0529       298 10*3/mm3 140 - 450  04/09/25 1716       262 10*3/mm3 140 - 450 02/06/25 0929    1 hour GTT         Antibody Screen (repeated)        3rd TM syphilis scrn (repeated)  RPR         3rd TM syphilis scrn (repeated) TP-Ab        3rd TM syphilis screen TB-Ab (FTA)        Syphilis cascade test TP-Ab (EIA)        Syphilis cascade TPPA        GTT Fasting        GTT 1 Hr        GTT 2 Hr        GTT 3 Hr        Group B Strep                  Other testing        Test Value Reference Range Date Time    Parvo IgG         CMV IgG                   Drug Screening       Test Value Reference Range Date Time    Amphetamine Screen        Barbiturate Screen        Benzodiazepine Screen        Methadone Screen        Phencyclidine Screen        Opiates Screen        THC Screen        Cocaine Screen        Propoxyphene Screen        Buprenorphine Screen        Methamphetamine Screen        Oxycodone Screen        Tricyclic Antidepressants Screen                  Legend    ^: Historical                          External Prenatal Results       Pregnancy Outside Results - Transcribed From Office Records - See Scanned Records For Details       Test Value Date Time    ABO ^ O  11/30/23     Rh ^ Positive  11/30/23     Antibody Screen       Varicella IgG       Rubella       Hgb  8.7 g/dL 04/10/25 0529       10.8 g/dL 04/09/25 1716       10.5 g/dL 02/06/25 0929    Hct  27.3 % 04/10/25 0529       33.6 % 04/09/25 1716       31.6 % 02/06/25 0929    HgB A1c        1h GTT       3h GTT Fasting       3h GTT 1 hour       3h GTT 2 hour       3h GTT 3 hour        Gonorrhea (discrete)       Chlamydia (discrete)       RPR       Syphils cascade: TP-Ab (FTA)       TP-Ab       TP-Ab (EIA)       TPPA       HBsAg       Herpes Simplex Virus PCR       Herpes Simplex VIrus Culture       HIV       Hep C RNA Quant PCR       Hep C Antibody       AFP       NIPT       Cystic Fibrosis ([place])       Cystic Fibroisis        Spinal Muscular atrophy       Fragile X       Group B Strep       GBS  Susceptibility to Clindamycin       GBS Susceptibility to Erythromycin       Fetal Fibronectin       Genetic Testing, Maternal Blood                 Drug Screening       Test Value Date Time    Urine Drug Screen       Amphetamine Screen       Barbiturate Screen       Benzodiazepine Screen       Methadone Screen       Phencyclidine Screen       Opiates Screen       THC Screen       Cocaine Screen       Propoxyphene Screen       Buprenorphine Screen       Methamphetamine Screen       Oxycodone Screen       Tricyclic Antidepressants Screen                 Legend    ^: Historical                             Past OB History:        Past Medical History: Past Medical History:   Diagnosis Date    ADHD (attention deficit hyperactivity disorder)     Anxiety disorder     Asthma     resolved    Depression     Seasonal allergies         Past Surgical History Past Surgical History:   Procedure Laterality Date    EAR TUBES      ENDOSCOPY      TONSILLECTOMY          Family History: Family History   Problem Relation Age of Onset    Diabetes Mother     Hypertension Mother     Diabetes Father     Hypertension Father     Diabetes Maternal Grandmother     Hypertension Maternal Grandmother     Diabetes Paternal Grandmother     Hypertension Paternal Grandfather     Leukemia Paternal Grandfather       Social History:  reports that she has been smoking cigarettes. She has a 1.3 pack-year smoking history. She has never been exposed to tobacco smoke. She has never used smokeless tobacco.   reports that she does not currently use alcohol.   reports that she does not currently use drugs after having used the following drugs: Marijuana.        General ROS: Pertinent items are noted in HPI    Objective      Vitals:     Vitals:    25 1600 25 2158 04/10/25 0515 04/10/25 0758   BP: 133/74 123/75 129/69 125/86   BP Location:  Right arm Right arm Right arm   Patient Position:  Sitting Sitting Sitting   Pulse: 94 77 80 114   Resp:   17 18 16   Temp:  98 °F (36.7 °C) 98.3 °F (36.8 °C) 98.6 °F (37 °C)   TempSrc:  Oral Oral Oral   SpO2:  99% 99% 100%   Weight:       Height:             [place]:   RNST     Physical Exam:     General Appearance:    Alert, cooperative, in no acute distress   Lungs:     Clear to auscultation,respirations regular.    Heart:    Regular rhythm and normal rate.   Breast Exam:    Deferred   Abdomen:     Normal bowel sounds, no masses, soft non-tender,         non-distended, no guarding, no rebound tenderness   Pelvic Exam:    deferred     Extremities:   Moves all extremities well, no edema, no cyanosis, no              redness   Skin:   No bleeding, bruising or rash   Neurologic:   No focal neurologic defect          Laboratory Results:   Lab Results (last 48 hours)       Procedure Component Value Units Date/Time    CBC & Differential [[id number]]  (Abnormal) Collected: 04/10/25 0529    Specimen: Blood Updated: 04/10/25 0625    Narrative:      The following orders were created for panel order CBC & Differential.  Procedure                               Abnormality         Status                     ---------                               -----------         ------                     Manual Differential[[id number]]          Abnormal            Final result               CBC Auto Differential[[id number]]        Abnormal            Final result                 Please view results for these tests on the individual orders.    Manual Differential [[id number]]  (Abnormal) Collected: 04/10/25 0529    Specimen: Blood Updated: 04/10/25 0625     Neutrophil % 66.0 %      Lymphocyte % 28.0 %      Monocyte % 2.0 %      Eosinophil % 1.0 %      Bands %  1.0 %      Metamyelocyte % 1.0 %      Atypical Lymphocyte % 1.0 %      Neutrophils Absolute 4.88 10*3/mm3      Lymphocytes Absolute 2.11 10*3/mm3      Monocytes Absolute 0.15 10*3/mm3      Eosinophils Absolute 0.07 10*3/mm3      RBC Morphology Normal     WBC Morphology Normal     Platelet  Morphology Normal    CBC Auto Differential [[id number]]  (Abnormal) Collected: 04/10/25 0529    Specimen: Blood Updated: 04/10/25 0625     WBC 7.29 10*3/mm3      RBC 3.09 10*6/mm3      Hemoglobin 8.7 g/dL      Hematocrit 27.3 %      MCV 88.3 fL      MCH 28.2 pg      MCHC 31.9 g/dL      RDW 14.0 %      RDW-SD 44.9 fl      MPV 9.4 fL      Platelets 237 10*3/mm3     Comprehensive Metabolic Panel [[id number]]  (Abnormal) Collected: 04/10/25 0529    Specimen: Blood Updated: 04/10/25 0613     Glucose 77 mg/dL      BUN 4 mg/dL      Creatinine 0.55 mg/dL      Sodium 136 mmol/L      Potassium 3.2 mmol/L      Chloride 104 mmol/L      CO2 19.4 mmol/L      Calcium 8.8 mg/dL      Total Protein 6.1 g/dL      Albumin 3.5 g/dL      ALT (SGPT) 83 U/L      AST (SGOT) 48 U/L      Alkaline Phosphatase 99 U/L      Total Bilirubin 0.6 mg/dL      Globulin 2.6 gm/dL      A/G Ratio 1.3 g/dL      BUN/Creatinine Ratio 7.3     Anion Gap 12.6 mmol/L      eGFR 133.1 mL/min/1.73     Narrative:      GFR Categories in Chronic Kidney Disease (CKD)      GFR Category          GFR (mL/min/1.73)    Interpretation  G1                     90 or greater         Normal or high (1)  G2                      60-89                Mild decrease (1)  G3a                   45-59                Mild to moderate decrease  G3b                   30-44                Moderate to severe decrease  G4                    15-29                Severe decrease  G5                    14 or less           Kidney failure          (1)In the absence of evidence of kidney disease, neither GFR category G1 or G2 fulfill the criteria for CKD.    eGFR calculation 2021 CKD-EPI creatinine equation, which does not include race as a factor    Urinalysis, Microscopic Only - Urine, Clean Catch [[id number]]  (Abnormal) Collected: 04/09/25 1802    Specimen: Urine, Clean Catch Updated: 04/09/25 1912     RBC, UA None Seen /HPF      WBC, UA 11-20 /HPF      Bacteria, UA Trace /HPF      Squamous  Epithelial Cells, UA 7-12 /HPF      Hyaline Casts, UA None Seen /LPF      Mucus, UA Large/3+ /HPF      Methodology Manual Light Microscopy    Urine Culture - Urine, Urine, Clean Catch [[id number]] Collected: 04/09/25 1802    Specimen: Urine, Clean Catch Updated: 04/09/25 1912    Urinalysis With Culture If Indicated - Urine, Clean Catch [[id number]]  (Abnormal) Collected: 04/09/25 1802    Specimen: Urine, Clean Catch Updated: 04/09/25 1900     Color, UA Dark Yellow     Appearance, UA Cloudy     Comment: Result checked          pH, UA 5.5     Specific Gravity, UA 1.027     Glucose, UA Negative     Ketones, UA >=160 mg/dL (4+)     Bilirubin, UA Small (1+)     Comment: Confirmation testing is unavailable.  A serum bilirubin is recommended for further assessment.        Blood, UA Negative     Protein, UA 30 mg/dL (1+)     Leuk Esterase, UA Moderate (2+)     Nitrite, UA Positive     Urobilinogen, UA 2.0 E.U./dL    Narrative:      In absence of clinical symptoms, the presence of pyuria, bacteria, and/or nitrites on the urinalysis result does not correlate with infection.    Magnesium [[id number]]  (Normal) Collected: 04/09/25 1751    Specimen: Blood from Arm, Left Updated: 04/09/25 1819     Magnesium 1.9 mg/dL     Comprehensive Metabolic Panel [[id number]]  (Abnormal) Collected: 04/09/25 1751    Specimen: Blood from Arm, Left Updated: 04/09/25 1819     Glucose 70 mg/dL      BUN 4 mg/dL      Creatinine 0.50 mg/dL      Sodium 138 mmol/L      Potassium 3.1 mmol/L      Chloride 105 mmol/L      CO2 18.6 mmol/L      Calcium 8.8 mg/dL      Total Protein 6.5 g/dL      Albumin 3.7 g/dL      ALT (SGPT) 68 U/L      AST (SGOT) 43 U/L      Alkaline Phosphatase 104 U/L      Total Bilirubin 0.7 mg/dL      Globulin 2.8 gm/dL      A/G Ratio 1.3 g/dL      BUN/Creatinine Ratio 8.0     Anion Gap 14.4 mmol/L      eGFR 136.2 mL/min/1.73     Narrative:      GFR Categories in Chronic Kidney Disease (CKD)      GFR Category          GFR (mL/min/1.73)     Interpretation  G1                     90 or greater         Normal or high (1)  G2                      60-89                Mild decrease (1)  G3a                   45-59                Mild to moderate decrease  G3b                   30-44                Moderate to severe decrease  G4                    15-29                Severe decrease  G5                    14 or less           Kidney failure          (1)In the absence of evidence of kidney disease, neither GFR category G1 or G2 fulfill the criteria for CKD.    eGFR calculation 2021 CKD-EPI creatinine equation, which does not include race as a factor    CBC (No Diff) [[id number]]  (Abnormal) Collected: 04/09/25 1716    Specimen: Blood from Arm, Left Updated: 04/09/25 1735     WBC 9.32 10*3/mm3      RBC 3.86 10*6/mm3      Hemoglobin 10.8 g/dL      Hematocrit 33.6 %      MCV 87.0 fL      MCH 28.0 pg      MCHC 32.1 g/dL      RDW 14.2 %      RDW-SD 44.4 fl      MPV 9.7 fL      Platelets 298 10*3/mm3                Assessment & Plan     Principal Problem:    Pregnant   Nausea and vomiting      Assessment:  1.  Intrauterine pregnancy at 26w3d gestation with reactive fetal status.    2.   Nausea and vomiting    3. Hypokalemia  4. Elevated liver enzymes  5. Urinary tract infection    Plan:  1. Fetal monitoring, K+ replacement per protocol, IV Rocephin 1gm for UTI, IV zofran, reglan, pepcid prn  2. RUQ ultrasound, EKG  3. GI consult  4. Advance diet as tolerated  5. Plan of care has been reviewed with patient.  6. Risks, benefits of treatment plan have been discussed.  7.  All questions have been answered.         Elaine Martines, APRN   4/10/2025   10:35 EDT

## 2025-04-10 NOTE — NURSING NOTE
Obstetrical Non-stress Test Interpretation     Name:  Tracy Carrington  MRN: [id number]    20 y.o. female  at 26w3d    Indication: Per MD order      Baseline: 130, Normal 110-160 bpm  Variability:   Moderate/Normal (amplitude 6-25 bpm)  Accelerations: Present (<32 weeks) 10 x 10 bpm   Decelerations: Absent   Contractions:  Absent       Impression:  Category I       Carmela Felder RN  4/10/2025  09:42 EDT

## 2025-04-11 ENCOUNTER — ON CAMPUS - OUTPATIENT (AMBULATORY)
Dept: URBAN - METROPOLITAN AREA HOSPITAL 85 | Facility: HOSPITAL | Age: 20
End: 2025-04-11
Payer: COMMERCIAL

## 2025-04-11 VITALS
DIASTOLIC BLOOD PRESSURE: 81 MMHG | HEART RATE: 98 BPM | TEMPERATURE: 98.2 F | RESPIRATION RATE: 17 BRPM | WEIGHT: 210.4 LBS | OXYGEN SATURATION: 96 % | HEIGHT: 63 IN | BODY MASS INDEX: 37.28 KG/M2 | SYSTOLIC BLOOD PRESSURE: 125 MMHG

## 2025-04-11 DIAGNOSIS — R94.5 ABNORMAL RESULTS OF LIVER FUNCTION STUDIES: ICD-10-CM

## 2025-04-11 DIAGNOSIS — R11.2 NAUSEA WITH VOMITING, UNSPECIFIED: ICD-10-CM

## 2025-04-11 DIAGNOSIS — R12 HEARTBURN: ICD-10-CM

## 2025-04-11 DIAGNOSIS — D64.9 ANEMIA, UNSPECIFIED: ICD-10-CM

## 2025-04-11 DIAGNOSIS — Z3A.26 26 WEEKS GESTATION OF PREGNANCY: ICD-10-CM

## 2025-04-11 DIAGNOSIS — K83.8 OTHER SPECIFIED DISEASES OF BILIARY TRACT: ICD-10-CM

## 2025-04-11 LAB
ALBUMIN SERPL-MCNC: 3.3 G/DL (ref 3.5–5.2)
ALBUMIN/GLOB SERPL: 1.3 G/DL
ALP SERPL-CCNC: 93 U/L (ref 39–117)
ALT SERPL W P-5'-P-CCNC: 76 U/L (ref 1–33)
ANION GAP SERPL CALCULATED.3IONS-SCNC: 12.9 MMOL/L (ref 5–15)
AST SERPL-CCNC: 36 U/L (ref 1–32)
BACTERIA SPEC AEROBE CULT: NO GROWTH
BILIRUB SERPL-MCNC: 0.3 MG/DL (ref 0–1.2)
BUN SERPL-MCNC: 2 MG/DL (ref 6–20)
BUN/CREAT SERPL: 4 (ref 7–25)
CALCIUM SPEC-SCNC: 8.6 MG/DL (ref 8.6–10.5)
CHLORIDE SERPL-SCNC: 106 MMOL/L (ref 98–107)
CO2 SERPL-SCNC: 19.1 MMOL/L (ref 22–29)
CREAT SERPL-MCNC: 0.5 MG/DL (ref 0.57–1)
EGFRCR SERPLBLD CKD-EPI 2021: 136.2 ML/MIN/1.73
GLOBULIN UR ELPH-MCNC: 2.5 GM/DL
GLUCOSE SERPL-MCNC: 72 MG/DL (ref 65–99)
POTASSIUM SERPL-SCNC: 3.4 MMOL/L (ref 3.5–5.2)
PROT SERPL-MCNC: 5.8 G/DL (ref 6–8.5)
QT INTERVAL: 378 MS
QTC INTERVAL: 419 MS
SODIUM SERPL-SCNC: 138 MMOL/L (ref 136–145)

## 2025-04-11 PROCEDURE — 80053 COMPREHEN METABOLIC PANEL: CPT | Performed by: STUDENT IN AN ORGANIZED HEALTH CARE EDUCATION/TRAINING PROGRAM

## 2025-04-11 PROCEDURE — 99223 1ST HOSP IP/OBS HIGH 75: CPT

## 2025-04-11 PROCEDURE — 59025 FETAL NON-STRESS TEST: CPT

## 2025-04-11 PROCEDURE — G0378 HOSPITAL OBSERVATION PER HR: HCPCS

## 2025-04-11 PROCEDURE — 96361 HYDRATE IV INFUSION ADD-ON: CPT

## 2025-04-11 RX ORDER — FAMOTIDINE 20 MG/1
20 TABLET, FILM COATED ORAL EVERY 12 HOURS PRN
Qty: 60 TABLET | Refills: 1 | Status: SHIPPED | OUTPATIENT
Start: 2025-04-11

## 2025-04-11 RX ORDER — ONDANSETRON 4 MG/1
4 TABLET, ORALLY DISINTEGRATING ORAL EVERY 4 HOURS PRN
Qty: 30 TABLET | Refills: 1 | Status: SHIPPED | OUTPATIENT
Start: 2025-04-11

## 2025-04-11 RX ORDER — METOCLOPRAMIDE 5 MG/1
5 TABLET ORAL EVERY 6 HOURS PRN
Qty: 30 TABLET | Refills: 1 | Status: SHIPPED | OUTPATIENT
Start: 2025-04-11

## 2025-04-11 RX ORDER — POTASSIUM CHLORIDE 1500 MG/1
20 TABLET, EXTENDED RELEASE ORAL DAILY
Status: DISCONTINUED | OUTPATIENT
Start: 2025-04-11 | End: 2025-04-11 | Stop reason: HOSPADM

## 2025-04-11 RX ADMIN — POTASSIUM CHLORIDE 20 MEQ: 1500 TABLET, EXTENDED RELEASE ORAL at 10:09

## 2025-04-11 RX ADMIN — FAMOTIDINE 20 MG: 20 TABLET, FILM COATED ORAL at 01:16

## 2025-04-11 NOTE — PLAN OF CARE
Goal Outcome Evaluation:           Progress: improving  Patients nausea has resolved.  Able to eat and hold down breakfast. NST WDL and will follow up with OB in 1 week.  Appointment made for 4/17/2025 @3pm with RENETTA Martines

## 2025-04-11 NOTE — DISCHARGE SUMMARY
Date of Discharge:  4/11/2025    Discharge Diagnosis: nausea and vomiting    Presenting Problem/History of Present Illness:  Active Hospital Problems    Diagnosis  POA    **Pregnant [Z34.90]  Not Applicable      Resolved Hospital Problems   No resolved problems to display.        Hospital Course:  Patient is a 20 y.o. female was sent over from the office on 4/9/25 by Dr. Andre for monitoring of nausea and vomiting. On admission, CMP and levels were abnormal for potassium and there was evidence of transaminitis. Patient was admitted for RUQ US, repletion of potassium, EKG, and tx for UTI. RUQ US reviewed and concern for cholecystitis. GI consulted and pt is stable for d/c. UTI was treated with rocephin on admission. K+ back to wnl after replacement. Liver functions are trending down. Pt feeling better and able to tolerate regular diet since admission with anti-emetics. Continue Zofran odt, pepcid, and reglan prn. Pt denies ctx, lof, vb. +FM, Cat 1 RNST. Rec outpt f/u in office in 1 week.     Procedures Performed:         Consults:   Consults       Date and Time Order Name Status Description    4/10/2025 10:34 AM Inpatient Gastroenterology Consult Completed             Pertinent Test Results:   Lab Results (last 72 hours)       Procedure Component Value Units Date/Time    Urine Culture - Urine, Urine, Clean Catch [[id number]]  (Normal) Collected: 04/09/25 1802    Specimen: Urine, Clean Catch Updated: 04/11/25 1153     Urine Culture No growth    Comprehensive Metabolic Panel [[id number]]  (Abnormal) Collected: 04/11/25 0520    Specimen: Blood from Arm, Right Updated: 04/11/25 0625     Glucose 72 mg/dL      BUN 2 mg/dL      Creatinine 0.50 mg/dL      Sodium 138 mmol/L      Potassium 3.4 mmol/L      Chloride 106 mmol/L      CO2 19.1 mmol/L      Calcium 8.6 mg/dL      Total Protein 5.8 g/dL      Albumin 3.3 g/dL      ALT (SGPT) 76 U/L      AST (SGOT) 36 U/L      Alkaline Phosphatase 93 U/L      Total Bilirubin 0.3  mg/dL      Globulin 2.5 gm/dL      A/G Ratio 1.3 g/dL      BUN/Creatinine Ratio 4.0     Anion Gap 12.9 mmol/L      eGFR 136.2 mL/min/1.73     Narrative:      GFR Categories in Chronic Kidney Disease (CKD)      GFR Category          GFR (mL/min/1.73)    Interpretation  G1                     90 or greater         Normal or high (1)  G2                      60-89                Mild decrease (1)  G3a                   45-59                Mild to moderate decrease  G3b                   30-44                Moderate to severe decrease  G4                    15-29                Severe decrease  G5                    14 or less           Kidney failure          (1)In the absence of evidence of kidney disease, neither GFR category G1 or G2 fulfill the criteria for CKD.    eGFR calculation 2021 CKD-EPI creatinine equation, which does not include race as a factor    Potassium [[id number]]  (Normal) Collected: 04/10/25 1748    Specimen: Blood Updated: 04/10/25 1824     Potassium 3.6 mmol/L     CBC & Differential [[id number]]  (Abnormal) Collected: 04/10/25 0529    Specimen: Blood Updated: 04/10/25 0625    Narrative:      The following orders were created for panel order CBC & Differential.  Procedure                               Abnormality         Status                     ---------                               -----------         ------                     Manual Differential[[id number]]          Abnormal            Final result               CBC Auto Differential[[id number]]        Abnormal            Final result                 Please view results for these tests on the individual orders.    Manual Differential [[id number]]  (Abnormal) Collected: 04/10/25 0529    Specimen: Blood Updated: 04/10/25 0625     Neutrophil % 66.0 %      Lymphocyte % 28.0 %      Monocyte % 2.0 %      Eosinophil % 1.0 %      Bands %  1.0 %      Metamyelocyte % 1.0 %      Atypical Lymphocyte % 1.0 %      Neutrophils Absolute 4.88 10*3/mm3       Lymphocytes Absolute 2.11 10*3/mm3      Monocytes Absolute 0.15 10*3/mm3      Eosinophils Absolute 0.07 10*3/mm3      RBC Morphology Normal     WBC Morphology Normal     Platelet Morphology Normal    CBC Auto Differential [[id number]]  (Abnormal) Collected: 04/10/25 0529    Specimen: Blood Updated: 04/10/25 0625     WBC 7.29 10*3/mm3      RBC 3.09 10*6/mm3      Hemoglobin 8.7 g/dL      Hematocrit 27.3 %      MCV 88.3 fL      MCH 28.2 pg      MCHC 31.9 g/dL      RDW 14.0 %      RDW-SD 44.9 fl      MPV 9.4 fL      Platelets 237 10*3/mm3     Comprehensive Metabolic Panel [[id number]]  (Abnormal) Collected: 04/10/25 0529    Specimen: Blood Updated: 04/10/25 0613     Glucose 77 mg/dL      BUN 4 mg/dL      Creatinine 0.55 mg/dL      Sodium 136 mmol/L      Potassium 3.2 mmol/L      Chloride 104 mmol/L      CO2 19.4 mmol/L      Calcium 8.8 mg/dL      Total Protein 6.1 g/dL      Albumin 3.5 g/dL      ALT (SGPT) 83 U/L      AST (SGOT) 48 U/L      Alkaline Phosphatase 99 U/L      Total Bilirubin 0.6 mg/dL      Globulin 2.6 gm/dL      A/G Ratio 1.3 g/dL      BUN/Creatinine Ratio 7.3     Anion Gap 12.6 mmol/L      eGFR 133.1 mL/min/1.73     Narrative:      GFR Categories in Chronic Kidney Disease (CKD)      GFR Category          GFR (mL/min/1.73)    Interpretation  G1                     90 or greater         Normal or high (1)  G2                      60-89                Mild decrease (1)  G3a                   45-59                Mild to moderate decrease  G3b                   30-44                Moderate to severe decrease  G4                    15-29                Severe decrease  G5                    14 or less           Kidney failure          (1)In the absence of evidence of kidney disease, neither GFR category G1 or G2 fulfill the criteria for CKD.    eGFR calculation 2021 CKD-EPI creatinine equation, which does not include race as a factor    Urinalysis, Microscopic Only - Urine, Clean Catch [[id number]]  (Abnormal)  Collected: 04/09/25 1802    Specimen: Urine, Clean Catch Updated: 04/09/25 1912     RBC, UA None Seen /HPF      WBC, UA 11-20 /HPF      Bacteria, UA Trace /HPF      Squamous Epithelial Cells, UA 7-12 /HPF      Hyaline Casts, UA None Seen /LPF      Mucus, UA Large/3+ /HPF      Methodology Manual Light Microscopy    Urinalysis With Culture If Indicated - Urine, Clean Catch [[id number]]  (Abnormal) Collected: 04/09/25 1802    Specimen: Urine, Clean Catch Updated: 04/09/25 1900     Color, UA Dark Yellow     Appearance, UA Cloudy     Comment: Result checked          pH, UA 5.5     Specific Gravity, UA 1.027     Glucose, UA Negative     Ketones, UA >=160 mg/dL (4+)     Bilirubin, UA Small (1+)     Comment: Confirmation testing is unavailable.  A serum bilirubin is recommended for further assessment.        Blood, UA Negative     Protein, UA 30 mg/dL (1+)     Leuk Esterase, UA Moderate (2+)     Nitrite, UA Positive     Urobilinogen, UA 2.0 E.U./dL    Narrative:      In absence of clinical symptoms, the presence of pyuria, bacteria, and/or nitrites on the urinalysis result does not correlate with infection.    Magnesium [[id number]]  (Normal) Collected: 04/09/25 1751    Specimen: Blood from Arm, Left Updated: 04/09/25 1819     Magnesium 1.9 mg/dL     Comprehensive Metabolic Panel [[id number]]  (Abnormal) Collected: 04/09/25 1751    Specimen: Blood from Arm, Left Updated: 04/09/25 1819     Glucose 70 mg/dL      BUN 4 mg/dL      Creatinine 0.50 mg/dL      Sodium 138 mmol/L      Potassium 3.1 mmol/L      Chloride 105 mmol/L      CO2 18.6 mmol/L      Calcium 8.8 mg/dL      Total Protein 6.5 g/dL      Albumin 3.7 g/dL      ALT (SGPT) 68 U/L      AST (SGOT) 43 U/L      Alkaline Phosphatase 104 U/L      Total Bilirubin 0.7 mg/dL      Globulin 2.8 gm/dL      A/G Ratio 1.3 g/dL      BUN/Creatinine Ratio 8.0     Anion Gap 14.4 mmol/L      eGFR 136.2 mL/min/1.73     Narrative:      GFR Categories in Chronic Kidney Disease (CKD)      GFR  Category          GFR (mL/min/1.73)    Interpretation  G1                     90 or greater         Normal or high (1)  G2                      60-89                Mild decrease (1)  G3a                   45-59                Mild to moderate decrease  G3b                   30-44                Moderate to severe decrease  G4                    15-29                Severe decrease  G5                    14 or less           Kidney failure          (1)In the absence of evidence of kidney disease, neither GFR category G1 or G2 fulfill the criteria for CKD.    eGFR calculation 2021 CKD-EPI creatinine equation, which does not include race as a factor    CBC (No Diff) [[id number]]  (Abnormal) Collected: 04/09/25 1716    Specimen: Blood from Arm, Left Updated: 04/09/25 1735     WBC 9.32 10*3/mm3      RBC 3.86 10*6/mm3      Hemoglobin 10.8 g/dL      Hematocrit 33.6 %      MCV 87.0 fL      MCH 28.0 pg      MCHC 32.1 g/dL      RDW 14.2 %      RDW-SD 44.4 fl      MPV 9.7 fL      Platelets 298 10*3/mm3           Imaging Results (Last 72 Hours)       Procedure Component Value Units Date/Time    US Abdomen Limited [[id number]] Collected: 04/09/25 2253     Updated: 04/09/25 2302    Narrative:      US ABDOMEN LIMITED    Date of Exam: 4/9/2025 9:25 PM EDT    Indication: transaminitis in pregnancy.    Comparison: No recent studies available for comparison.    Technique: Grayscale and color Doppler ultrasound evaluation of the right upper quadrant was performed.      Findings:  The liver appears normal in size and echogenicity. No focal lesions identified. Normal flow is present within the hepatic vasculature.    Small volume gallbladder sludge is visualized. Gallbladder is not abnormally distended. There is no evidence of gallbladder wall thickening or pericholecystic fluid. Common bile duct measures 0.4 cm. There is no intrahepatic biliary ductal dilatation.    Limited visualization of the pancreas demonstrates no gross  abnormality.    The right kidney appears normal in size with no focal lesions. No evidence of nephrolithiasis or hydronephrosis. The right kidney measures 11.4 x 4.7 x 5 cm.           Impression:      Impression:  1.Small volume gallbladder sludge without sonographic evidence cholecystitis.        Electronically Signed: Fidencio Oneil MD    4/9/2025 10:56 PM EDT    Workstation ID: [id number]            Condition on Discharge:  Good    Vital Signs:  Vitals:    04/10/25 2300 04/11/25 0300 04/11/25 0811 04/11/25 1118   BP:  126/78 120/69 125/81   BP Location:  Right arm Right arm Right arm   Patient Position:  Lying Sitting Lying   Pulse: 86 76 74 98   Resp: 17 17 16 17   Temp: 97.8 °F (36.6 °C) 97.8 °F (36.6 °C) 97.8 °F (36.6 °C) 98.2 °F (36.8 °C)   TempSrc: Oral Oral Oral Oral   SpO2: 96% 99% 96% 96%   Weight:       Height:           Physical Exam:     General Appearance:    Alert, cooperative, in no acute distress   Lungs:     Clear to auscultation,respirations regular, even and                   unlabored    Heart:    Regular rhythm and normal rate.    Breast Exam:    Deferred   Abdomen:     Normal bowel sounds, no masses, no organomegaly, soft        non-tender, non-distended, no guarding, no rebound                 tenderness   Genitalia:    Deferred   Extremities:   Moves all extremities well, no edema, no cyanosis, no             redness       Discharge Disposition:  Home    Discharge Medications:     Discharge Medications        New Medications        Instructions Start Date   metoclopramide 5 MG tablet  Commonly known as: REGLAN   5 mg, Oral, Every 6 Hours PRN      ondansetron ODT 4 MG disintegrating tablet  Commonly known as: ZOFRAN-ODT   4 mg, Oral, Every 4 Hours PRN             Changes to Medications        Instructions Start Date   famotidine 20 MG tablet  Commonly known as: PEPCID  What changed:   when to take this  reasons to take this   20 mg, Oral, Every 12 Hours PRN             Continue These  Medications        Instructions Start Date   prenatal (CLASSIC) vitamin 28-0.8 MG tablet tablet  Generic drug: prenatal vitamin   1 tablet, Oral, Daily               Activity at Discharge:     Follow-up Appointments  No future appointments.  Additional Instructions for the Follow-ups that You Need to Schedule       Call MD With Problems / Concerns   As directed      Instructions: Temperature >100.4  Unable to tolerate food or fluids   labor s/sx; ctx >6/hr, lof, vag bldg  Decreased fetal movement    Order Comments: Instructions: Temperature >100.4 Unable to tolerate food or fluids  labor s/sx; ctx >6/hr, lof, vag bldg Decreased fetal movement         Discharge Follow-up with Specialty: OBGYN; 1 Week   As directed      Specialty: OBGYN   Follow Up: 1 Week                Test Results Pending at Discharge  Pending Results       None          ASSESSMENT AND PLAN:   Nausea and vomiting, improved  26 weeks pregnant, Cat 1 RNST, no ctx  Heartburn  Gallbladder sludge  Elevated LFTs, trending down  Anemia - no overt GIB at this time  Hypokalemia resolved after replacement     Plan:  - Continue PPI daily, take it 30 to 60 minutes before first meal of the day.  - Continue anti-emetics; Zofran odt and reglan as directed as needed for nausea. Call if unable to cory food/fluids.   - Appreciate GI consult  - Cholestatic liver enzymes are normal, repeat liver enzymes outpt  -  labor s/sx and kick counts reviewed  - Followup in 1 week with OB/GYN     RENETTA Jaime  25  12:38 EDT

## 2025-04-11 NOTE — NON STRESS TEST
Obstetrical Non-stress Test Interpretation     Name:  Tracy Carrington  MRN: [id number]    20 y.o. female  at 26w4d    Indication: Antepartum      Baseline: Normal 110-160 bpm  Variability:   Moderate/Normal (amplitude 6-25 bpm)  Accelerations: Present (32 weeks+) 15 x 15 bpm  Decelerations: Absent   Contractions:  Absent       Impression:  Category I       Sita Mcconnell RN  2025  11:39 EDT

## 2025-04-11 NOTE — CONSULTS
GI CONSULT  NOTE:    Referring Provider:  Veronica Andre MD    Chief complaint: Nausea and vomiting, questionable cholecystitis    Subjective .     History of present illness: Tracy Carrington is a 20 y.o. female with PMH GE junction ring previously dilated.  She is currently 26 weeks and 3 days pregnant.  She presented to the hospital due to nausea and vomiting, which is why GI was consulted.    Patient reports that nausea and vomiting started about a week ago after running out of Zofran which is typically what helps control her nausea.  She takes Zofran 3-6 times per day.  At that time she was throwing up approximately every 20 minutes.  At time of exam she is tolerating her diet and is feeling much better.  She denies having abdominal pain.  She has daily bowel movement without melena or BRBPR.  She takes omeprazole daily for heartburn.  She reports previous alcohol consumption but has slowed down tremendously and has not been consuming alcohol this pregnancy.    Endo History:  10/2019 EGD by Dr. Fonseca showed ring in GE junction dilated to 60 Welsh.  Normal stomach and duodenum.  5/2018 EGD by Dr. Fonseca showed esophageal ring s/p dilation to 20 mm.  Gastritis (negative for H pylori).  Normal duodenum.  Esophageal biopsy negative for EOE.    Past Medical History:  Past Medical History:   Diagnosis Date    ADHD (attention deficit hyperactivity disorder)     Anxiety disorder     Asthma     resolved    Depression     Seasonal allergies        Past Surgical History:  Past Surgical History:   Procedure Laterality Date    EAR TUBES      ENDOSCOPY      TONSILLECTOMY         Social History:  Social History     Tobacco Use    Smoking status: Every Day     Current packs/day: 0.25     Average packs/day: 0.3 packs/day for 5.0 years (1.3 ttl pk-yrs)     Types: Cigarettes     Passive exposure: Never    Smokeless tobacco: Never    Tobacco comments:     Quit E Cig- middle of 2019/ Smokes 3 cigs per day   Vaping Use     Vaping status: Former   Substance Use Topics    Alcohol use: Not Currently    Drug use: Not Currently     Types: Marijuana     Comment: quit with +UPT       Family History:  Family History   Problem Relation Age of Onset    Diabetes Mother     Hypertension Mother     Diabetes Father     Hypertension Father     Diabetes Maternal Grandmother     Hypertension Maternal Grandmother     Diabetes Paternal Grandmother     Hypertension Paternal Grandfather     Leukemia Paternal Grandfather        Medications:  Medications Prior to Admission   Medication Sig Dispense Refill Last Dose/Taking    famotidine (PEPCID) 20 MG tablet Take 1 tablet by mouth Daily.       prenatal vitamin (prenatal, CLASSIC, vitamin) tablet Take 1 tablet by mouth Daily.          Scheduled Meds:potassium chloride, 20 mEq, Oral, Daily      Continuous Infusions:   PRN Meds:.  famotidine **OR** famotidine    metoclopramide    ondansetron ODT **OR** ondansetron    Potassium Replacement - Follow Nurse / BPA Driven Protocol    ALLERGIES:  Patient has no known allergies.    ROS:  Review of Systems    The following systems were reviewed and negative;   Constitution:  No fevers, chills, no unintentional weight loss  Skin: no rash, no jaundice  Eyes:  No blurry vision, no eye pain  HENT:  No change in hearing or smell  Resp:  No dyspnea or cough  CV:  No chest pain or palpitations  :  No dysuria, hematuria  Musculoskeletal:  No leg cramps or arthralgias  Neuro:  No tremor, no numbness  Psych:  No depression or confusion    Objective     Vital Signs:   Vitals:    04/10/25 2300 04/11/25 0300 04/11/25 0811 04/11/25 1118   BP:  126/78 120/69 125/81   BP Location:  Right arm Right arm Right arm   Patient Position:  Lying Sitting Lying   Pulse: 86 76 74 98   Resp: 17 17 16 17   Temp: 97.8 °F (36.6 °C) 97.8 °F (36.6 °C) 97.8 °F (36.6 °C) 98.2 °F (36.8 °C)   TempSrc: Oral Oral Oral Oral   SpO2: 96% 99% 96% 96%   Weight:       Height:           Physical Exam:     General  "Appearance:    Awake and alert, in no acute distress   Head:    Normocephalic, without obvious abnormality, atraumatic   Eyes:            Conjunctivae normal, anicteric sclerae, pupils equal   Ears:    Ears appear intact with no abnormalities noted   Throat:   No oral lesions, no thrush, oral mucosa moist   Neck:   Supple, no JVD   Lungs:     Respirations regular, even and unlabored       Chest Wall:    No abnormalities observed   Abdomen:     Normal bowel sounds, soft, nontender, no rebound or guarding, nondistended   Rectal:     Deferred   Extremities:   Moves all extremities, no edema, no cyanosis   Pulses:   Pulses palpable and equal bilaterally   Skin:   No rash, no jaundice, normal palpation             Results Review:   I reviewed the patient's labs and imaging.  CBC    Results from last 7 days   Lab Units 04/10/25  0529 04/09/25  1716   WBC 10*3/mm3 7.29 9.32   HEMOGLOBIN g/dL 8.7* 10.8*   PLATELETS 10*3/mm3 237 298     CMP   Results from last 7 days   Lab Units 04/11/25  0520 04/10/25  1748 04/10/25  0529 04/09/25  1751   SODIUM mmol/L 138  --  136 138   POTASSIUM mmol/L 3.4* 3.6 3.2* 3.1*   CHLORIDE mmol/L 106  --  104 105   CO2 mmol/L 19.1*  --  19.4* 18.6*   BUN mg/dL 2*  --  4* 4*   CREATININE mg/dL 0.50*  --  0.55* 0.50*   GLUCOSE mg/dL 72  --  77 70   ALBUMIN g/dL 3.3*  --  3.5 3.7   BILIRUBIN mg/dL 0.3  --  0.6 0.7   ALK PHOS U/L 93  --  99 104   AST (SGOT) U/L 36*  --  48* 43*   ALT (SGPT) U/L 76*  --  83* 68*   MAGNESIUM mg/dL  --   --   --  1.9     Cr Clearance Estimated Creatinine Clearance: 193.9 mL/min (A) (by C-G formula based on SCr of 0.5 mg/dL (L)).  Coag     HbA1C No results found for: \"HGBA1C\"  Blood Glucose No results found for: \"POCGLU\"  Infection   Results from last 7 days   Lab Units 04/09/25  1802   URINECX  No growth     UA    Results from last 7 days   Lab Units 04/09/25  1802   NITRITE UA  Positive*   WBC UA /HPF 11-20*   BACTERIA UA /HPF Trace*   SQUAM EPITHEL UA /HPF 7-12* "   URINECX  No growth     Radiology(recent) US Abdomen Limited  Result Date: 4/9/2025  Impression: 1.Small volume gallbladder sludge without sonographic evidence cholecystitis. Electronically Signed: Fidencio Oneil MD  4/9/2025 10:56 PM EDT  Workstation ID: [id number]        ASSESSMENT AND PLAN:  20 y.o. female with PMH GE junction ring previously dilated.  She is currently 26 weeks and 3 days pregnant.  She presented to the hospital due to nausea and vomiting, which is why GI was consulted.    Problem List:  Nausea and vomiting, improved  26 weeks pregnant  Heartburn  Gallbladder sludge  Elevated LFTs  Anemia - no overt GIB at this time    Plan:  - Continue PPI daily, take it 30 to 60 minutes before first meal of the day.  - Okay to continue Zofran as needed for nausea.  Could also consider ginger, vitamin B6, Unisom or combination medication Diclegis.  - Ultrasound showed sludge, no stones.  Elevation in AST and ALT could be from previous alcohol consumption and combination of MASLD.  Cholestatic liver enzymes are normal.  - Okay to be discharged from a GI standpoint if she is otherwise medically stable    I discussed the patients findings and my recommendations with the patient. Patient was seen with GI attending, addendum to follow. We appreciate the referral.    Electronically signed by Boo Cisneros PA-C, 04/11/25, 12:06 PM EDT.

## 2025-05-22 LAB — EXTERNAL GROUP B STREP ANTIGEN: NORMAL

## 2025-05-28 ENCOUNTER — APPOINTMENT (OUTPATIENT)
Dept: ULTRASOUND IMAGING | Facility: HOSPITAL | Age: 20
End: 2025-05-28
Payer: OTHER GOVERNMENT

## 2025-05-28 ENCOUNTER — HOSPITAL ENCOUNTER (OUTPATIENT)
Facility: HOSPITAL | Age: 20
Discharge: HOME | End: 2025-05-28
Attending: STUDENT IN AN ORGANIZED HEALTH CARE EDUCATION/TRAINING PROGRAM | Admitting: STUDENT IN AN ORGANIZED HEALTH CARE EDUCATION/TRAINING PROGRAM
Payer: OTHER GOVERNMENT

## 2025-05-28 VITALS
SYSTOLIC BLOOD PRESSURE: 124 MMHG | HEART RATE: 88 BPM | BODY MASS INDEX: 37.15 KG/M2 | DIASTOLIC BLOOD PRESSURE: 52 MMHG | WEIGHT: 209.66 LBS | TEMPERATURE: 98.3 F | HEIGHT: 63 IN

## 2025-05-28 PROCEDURE — 76819 FETAL BIOPHYS PROFIL W/O NST: CPT

## 2025-05-28 PROCEDURE — 25810000003 LACTATED RINGERS SOLUTION: Performed by: STUDENT IN AN ORGANIZED HEALTH CARE EDUCATION/TRAINING PROGRAM

## 2025-05-28 PROCEDURE — G0463 HOSPITAL OUTPT CLINIC VISIT: HCPCS

## 2025-05-28 RX ADMIN — SODIUM CHLORIDE, POTASSIUM CHLORIDE, SODIUM LACTATE AND CALCIUM CHLORIDE 1000 ML: 600; 310; 30; 20 INJECTION, SOLUTION INTRAVENOUS at 17:18

## 2025-05-28 NOTE — NURSING NOTE
Pt. Arrived on unit after OB appointment in the office. Dr. Andre sent her from the office due to contractions being traced during NST at appointment. Dr. Andre wanted to ROL so continuous fetal monitoring was performed and cervix was re-examined after 2 hours. No cervical change was noted. BPP was 8/8 but baby lying in transverse position. Fluid bolus was given, no contractions were noted on monitor, and patient was discharged home in stable condition. Patient educated on how to identify signs of labor and when to return to hospital. Patient verbalized understanding.

## 2025-06-06 ENCOUNTER — HOSPITAL ENCOUNTER (OUTPATIENT)
Facility: HOSPITAL | Age: 20
Discharge: HOME | End: 2025-06-07
Attending: STUDENT IN AN ORGANIZED HEALTH CARE EDUCATION/TRAINING PROGRAM | Admitting: OBSTETRICS & GYNECOLOGY
Payer: OTHER GOVERNMENT

## 2025-06-06 PROCEDURE — 59025 FETAL NON-STRESS TEST: CPT

## 2025-06-06 PROCEDURE — G0463 HOSPITAL OUTPT CLINIC VISIT: HCPCS

## 2025-06-06 RX ORDER — FERROUS SULFATE 325(65) MG
325 TABLET ORAL
COMMUNITY

## 2025-06-06 NOTE — LETTER
June 7, 2025      [facility]  [address] [place] [address] [phone]          Patient: Tracy Carrington   YOB: 2005   Date of Visit: 5/28/2025       To Whom It May Concern:    Tracy Carrington was seen at [facility] on 5/28/2025.    Please excuse Buzz Pepper (partner) from work on  06/06/2025        Sincerely,       KAILEY Napoles RNC

## 2025-06-07 VITALS
DIASTOLIC BLOOD PRESSURE: 75 MMHG | WEIGHT: 212.74 LBS | TEMPERATURE: 98 F | OXYGEN SATURATION: 99 % | HEIGHT: 63 IN | BODY MASS INDEX: 37.7 KG/M2 | HEART RATE: 106 BPM | SYSTOLIC BLOOD PRESSURE: 122 MMHG

## 2025-06-07 PROCEDURE — 59025 FETAL NON-STRESS TEST: CPT

## 2025-06-07 NOTE — NON STRESS TEST
Obstetrical Non-stress Test Interpretation     Name:  Tracy Carrington  MRN: [id number]    20 y.o. female  at 39w0d    Indication: OB triage      Baseline: 130 Normal 110-160 bpm  Variability:   Moderate/Normal (amplitude 6-25 bpm)  Accelerations: Present (32 weeks+) 15 x 15 bpm  Decelerations: Absent   Contractions:  Absent       Impression:  Category I       Angely Napoles RN  2025  00:46 EDT

## 2025-06-18 ENCOUNTER — HOSPITAL ENCOUNTER (OUTPATIENT)
Dept: LABOR AND DELIVERY | Facility: HOSPITAL | Age: 20
Discharge: HOME | End: 2025-06-18
Payer: OTHER GOVERNMENT

## 2025-06-18 ENCOUNTER — HOSPITAL ENCOUNTER (INPATIENT)
Facility: HOSPITAL | Age: 20
LOS: 2 days | Discharge: HOME | End: 2025-06-20
Attending: STUDENT IN AN ORGANIZED HEALTH CARE EDUCATION/TRAINING PROGRAM | Admitting: STUDENT IN AN ORGANIZED HEALTH CARE EDUCATION/TRAINING PROGRAM
Payer: OTHER GOVERNMENT

## 2025-06-18 ENCOUNTER — PREP FOR SURGERY (OUTPATIENT)
Dept: OTHER | Facility: HOSPITAL | Age: 20
End: 2025-06-18
Payer: OTHER GOVERNMENT

## 2025-06-18 PROBLEM — Z34.90 TERM PREGNANCY: Status: ACTIVE | Noted: 2025-06-18

## 2025-06-18 PROCEDURE — 86900 BLOOD TYPING SEROLOGIC ABO: CPT

## 2025-06-18 PROCEDURE — 86850 RBC ANTIBODY SCREEN: CPT | Performed by: STUDENT IN AN ORGANIZED HEALTH CARE EDUCATION/TRAINING PROGRAM

## 2025-06-18 PROCEDURE — 86900 BLOOD TYPING SEROLOGIC ABO: CPT | Performed by: STUDENT IN AN ORGANIZED HEALTH CARE EDUCATION/TRAINING PROGRAM

## 2025-06-18 PROCEDURE — 85025 COMPLETE CBC W/AUTO DIFF WBC: CPT | Performed by: STUDENT IN AN ORGANIZED HEALTH CARE EDUCATION/TRAINING PROGRAM

## 2025-06-18 PROCEDURE — 86901 BLOOD TYPING SEROLOGIC RH(D): CPT

## 2025-06-18 PROCEDURE — G0432 EIA HIV-1/HIV-2 SCREEN: HCPCS | Performed by: STUDENT IN AN ORGANIZED HEALTH CARE EDUCATION/TRAINING PROGRAM

## 2025-06-18 PROCEDURE — 86901 BLOOD TYPING SEROLOGIC RH(D): CPT | Performed by: STUDENT IN AN ORGANIZED HEALTH CARE EDUCATION/TRAINING PROGRAM

## 2025-06-18 PROCEDURE — 86780 TREPONEMA PALLIDUM: CPT | Performed by: STUDENT IN AN ORGANIZED HEALTH CARE EDUCATION/TRAINING PROGRAM

## 2025-06-18 RX ORDER — ONDANSETRON 2 MG/ML
4 INJECTION INTRAMUSCULAR; INTRAVENOUS EVERY 6 HOURS PRN
Status: CANCELLED | OUTPATIENT
Start: 2025-06-18

## 2025-06-18 RX ORDER — METHYLERGONOVINE MALEATE 0.2 MG/ML
200 INJECTION INTRAVENOUS ONCE AS NEEDED
Status: CANCELLED | OUTPATIENT
Start: 2025-06-18

## 2025-06-18 RX ORDER — LIDOCAINE HYDROCHLORIDE 10 MG/ML
0.5 INJECTION, SOLUTION EPIDURAL; INFILTRATION; INTRACAUDAL; PERINEURAL ONCE AS NEEDED
Status: DISCONTINUED | OUTPATIENT
Start: 2025-06-18 | End: 2025-06-19 | Stop reason: HOSPADM

## 2025-06-18 RX ORDER — MISOPROSTOL 200 UG/1
800 TABLET ORAL AS NEEDED
Status: CANCELLED | OUTPATIENT
Start: 2025-06-18

## 2025-06-18 RX ORDER — SODIUM CHLORIDE 9 MG/ML
40 INJECTION, SOLUTION INTRAVENOUS AS NEEDED
Status: CANCELLED | OUTPATIENT
Start: 2025-06-18

## 2025-06-18 RX ORDER — ONDANSETRON 2 MG/ML
4 INJECTION INTRAMUSCULAR; INTRAVENOUS EVERY 6 HOURS PRN
Status: DISCONTINUED | OUTPATIENT
Start: 2025-06-18 | End: 2025-06-19 | Stop reason: HOSPADM

## 2025-06-18 RX ORDER — IBUPROFEN 600 MG/1
600 TABLET, FILM COATED ORAL EVERY 6 HOURS PRN
Status: CANCELLED | OUTPATIENT
Start: 2025-06-18

## 2025-06-18 RX ORDER — LIDOCAINE HYDROCHLORIDE 10 MG/ML
0.5 INJECTION, SOLUTION EPIDURAL; INFILTRATION; INTRACAUDAL; PERINEURAL ONCE AS NEEDED
Status: CANCELLED | OUTPATIENT
Start: 2025-06-18

## 2025-06-18 RX ORDER — ONDANSETRON 4 MG/1
4 TABLET, ORALLY DISINTEGRATING ORAL EVERY 6 HOURS PRN
Status: CANCELLED | OUTPATIENT
Start: 2025-06-18

## 2025-06-18 RX ORDER — OXYTOCIN/0.9 % SODIUM CHLORIDE 30/500 ML
2-20 PLASTIC BAG, INJECTION (ML) INTRAVENOUS
Status: DISCONTINUED | OUTPATIENT
Start: 2025-06-18 | End: 2025-06-19 | Stop reason: HOSPADM

## 2025-06-18 RX ORDER — SODIUM CHLORIDE 0.9 % (FLUSH) 0.9 %
10 SYRINGE (ML) INJECTION AS NEEDED
Status: CANCELLED | OUTPATIENT
Start: 2025-06-18

## 2025-06-18 RX ORDER — HYDROXYZINE HYDROCHLORIDE 25 MG/1
50 TABLET, FILM COATED ORAL NIGHTLY PRN
Status: CANCELLED | OUTPATIENT
Start: 2025-06-18

## 2025-06-18 RX ORDER — ACETAMINOPHEN 325 MG/1
650 TABLET ORAL EVERY 4 HOURS PRN
Status: DISCONTINUED | OUTPATIENT
Start: 2025-06-18 | End: 2025-06-19 | Stop reason: HOSPADM

## 2025-06-18 RX ORDER — ACETAMINOPHEN 325 MG/1
650 TABLET ORAL EVERY 4 HOURS PRN
Status: CANCELLED | OUTPATIENT
Start: 2025-06-18

## 2025-06-18 RX ORDER — OXYTOCIN/0.9 % SODIUM CHLORIDE 30/500 ML
999 PLASTIC BAG, INJECTION (ML) INTRAVENOUS ONCE
Status: CANCELLED | OUTPATIENT
Start: 2025-06-18 | End: 2025-06-18

## 2025-06-18 RX ORDER — SODIUM CHLORIDE 0.9 % (FLUSH) 0.9 %
10 SYRINGE (ML) INJECTION AS NEEDED
Status: DISCONTINUED | OUTPATIENT
Start: 2025-06-18 | End: 2025-06-19 | Stop reason: HOSPADM

## 2025-06-18 RX ORDER — SODIUM CHLORIDE, SODIUM LACTATE, POTASSIUM CHLORIDE, CALCIUM CHLORIDE 600; 310; 30; 20 MG/100ML; MG/100ML; MG/100ML; MG/100ML
125 INJECTION, SOLUTION INTRAVENOUS CONTINUOUS
Status: DISCONTINUED | OUTPATIENT
Start: 2025-06-18 | End: 2025-06-19

## 2025-06-18 RX ORDER — ONDANSETRON 4 MG/1
4 TABLET, ORALLY DISINTEGRATING ORAL EVERY 6 HOURS PRN
Status: DISCONTINUED | OUTPATIENT
Start: 2025-06-18 | End: 2025-06-19 | Stop reason: HOSPADM

## 2025-06-18 RX ORDER — SODIUM CHLORIDE 0.9 % (FLUSH) 0.9 %
10 SYRINGE (ML) INJECTION EVERY 12 HOURS SCHEDULED
Status: CANCELLED | OUTPATIENT
Start: 2025-06-18

## 2025-06-18 RX ORDER — SODIUM CHLORIDE 0.9 % (FLUSH) 0.9 %
10 SYRINGE (ML) INJECTION EVERY 12 HOURS SCHEDULED
Status: DISCONTINUED | OUTPATIENT
Start: 2025-06-18 | End: 2025-06-19 | Stop reason: HOSPADM

## 2025-06-18 RX ORDER — OXYTOCIN/0.9 % SODIUM CHLORIDE 30/500 ML
2-20 PLASTIC BAG, INJECTION (ML) INTRAVENOUS
Status: CANCELLED | OUTPATIENT
Start: 2025-06-18

## 2025-06-18 RX ORDER — SODIUM CHLORIDE 9 MG/ML
40 INJECTION, SOLUTION INTRAVENOUS AS NEEDED
Status: DISCONTINUED | OUTPATIENT
Start: 2025-06-18 | End: 2025-06-19 | Stop reason: HOSPADM

## 2025-06-18 RX ORDER — OXYTOCIN/0.9 % SODIUM CHLORIDE 30/500 ML
250 PLASTIC BAG, INJECTION (ML) INTRAVENOUS CONTINUOUS
Status: CANCELLED | OUTPATIENT
Start: 2025-06-18 | End: 2025-06-18

## 2025-06-18 RX ORDER — SODIUM CHLORIDE, SODIUM LACTATE, POTASSIUM CHLORIDE, CALCIUM CHLORIDE 600; 310; 30; 20 MG/100ML; MG/100ML; MG/100ML; MG/100ML
125 INJECTION, SOLUTION INTRAVENOUS CONTINUOUS
Status: CANCELLED | OUTPATIENT
Start: 2025-06-18 | End: 2025-06-20

## 2025-06-18 RX ORDER — CARBOPROST TROMETHAMINE 250 UG/ML
250 INJECTION, SOLUTION INTRAMUSCULAR AS NEEDED
Status: CANCELLED | OUTPATIENT
Start: 2025-06-18

## 2025-06-19 ENCOUNTER — ANESTHESIA EVENT (OUTPATIENT)
Dept: LABOR AND DELIVERY | Facility: HOSPITAL | Age: 20
End: 2025-06-19
Payer: OTHER GOVERNMENT

## 2025-06-19 ENCOUNTER — ANESTHESIA (OUTPATIENT)
Dept: LABOR AND DELIVERY | Facility: HOSPITAL | Age: 20
End: 2025-06-19
Payer: OTHER GOVERNMENT

## 2025-06-19 PROBLEM — Z34.90 PREGNANT: Status: RESOLVED | Noted: 2025-04-09 | Resolved: 2025-06-19

## 2025-06-19 PROBLEM — Z34.90 TERM PREGNANCY: Status: RESOLVED | Noted: 2025-06-18 | Resolved: 2025-06-19

## 2025-06-19 LAB
ABO GROUP BLD: NORMAL
BASOPHILS # BLD AUTO: 0.02 10*3/MM3 (ref 0–0.2)
BASOPHILS NFR BLD AUTO: 0.2 % (ref 0–1.5)
BLD GP AB SCN SERPL QL: NEGATIVE
DEPRECATED RDW RBC AUTO: 46.4 FL (ref 37–54)
EOSINOPHIL # BLD AUTO: 0.04 10*3/MM3 (ref 0–0.4)
EOSINOPHIL NFR BLD AUTO: 0.4 % (ref 0.3–6.2)
ERYTHROCYTE [DISTWIDTH] IN BLOOD BY AUTOMATED COUNT: 15.4 % (ref 12.3–15.4)
HCT VFR BLD AUTO: 30.3 % (ref 34–46.6)
HGB BLD-MCNC: 9.5 G/DL (ref 12–15.9)
HIV 1+2 AB+HIV1 P24 AG SERPL QL IA: NORMAL
IMM GRANULOCYTES # BLD AUTO: 0.05 10*3/MM3 (ref 0–0.05)
IMM GRANULOCYTES NFR BLD AUTO: 0.5 % (ref 0–0.5)
LYMPHOCYTES # BLD AUTO: 1.92 10*3/MM3 (ref 0.7–3.1)
LYMPHOCYTES NFR BLD AUTO: 17.7 % (ref 19.6–45.3)
MCH RBC QN AUTO: 26 PG (ref 26.6–33)
MCHC RBC AUTO-ENTMCNC: 31.4 G/DL (ref 31.5–35.7)
MCV RBC AUTO: 82.8 FL (ref 79–97)
MONOCYTES # BLD AUTO: 0.68 10*3/MM3 (ref 0.1–0.9)
MONOCYTES NFR BLD AUTO: 6.3 % (ref 5–12)
NEUTROPHILS NFR BLD AUTO: 74.9 % (ref 42.7–76)
NEUTROPHILS NFR BLD AUTO: 8.12 10*3/MM3 (ref 1.7–7)
NRBC BLD AUTO-RTO: 0 /100 WBC (ref 0–0.2)
PLATELET # BLD AUTO: 277 10*3/MM3 (ref 140–450)
PMV BLD AUTO: 10.6 FL (ref 6–12)
RBC # BLD AUTO: 3.66 10*6/MM3 (ref 3.77–5.28)
RH BLD: POSITIVE
T&S EXPIRATION DATE: NORMAL
TREPONEMA PALLIDUM IGG+IGM AB [PRESENCE] IN SERUM OR PLASMA BY IMMUNOASSAY: NORMAL
WBC NRBC COR # BLD AUTO: 10.83 10*3/MM3 (ref 3.4–10.8)

## 2025-06-19 PROCEDURE — C1755 CATHETER, INTRASPINAL: HCPCS | Performed by: ANESTHESIOLOGY

## 2025-06-19 PROCEDURE — 25010000002 FAMOTIDINE 10 MG/ML SOLUTION: Performed by: STUDENT IN AN ORGANIZED HEALTH CARE EDUCATION/TRAINING PROGRAM

## 2025-06-19 PROCEDURE — 25010000002 LIDOCAINE 1% - EPINEPHRINE 1:100000 1 %-1:100000 SOLUTION: Performed by: NURSE ANESTHETIST, CERTIFIED REGISTERED

## 2025-06-19 PROCEDURE — 10907ZC DRAINAGE OF AMNIOTIC FLUID, THERAPEUTIC FROM PRODUCTS OF CONCEPTION, VIA NATURAL OR ARTIFICIAL OPENING: ICD-10-PCS | Performed by: STUDENT IN AN ORGANIZED HEALTH CARE EDUCATION/TRAINING PROGRAM

## 2025-06-19 PROCEDURE — 25810000003 LACTATED RINGERS PER 1000 ML: Performed by: STUDENT IN AN ORGANIZED HEALTH CARE EDUCATION/TRAINING PROGRAM

## 2025-06-19 RX ORDER — LIDOCAINE HYDROCHLORIDE AND EPINEPHRINE 15; 5 MG/ML; UG/ML
INJECTION, SOLUTION EPIDURAL
Status: ACTIVE
Start: 2025-06-19 | End: 2025-06-19

## 2025-06-19 RX ORDER — FENTANYL/BUPIVACAINE/NS/PF 2-1250MCG
PLASTIC BAG, INJECTION (ML) INJECTION
Status: COMPLETED
Start: 2025-06-19 | End: 2025-06-19

## 2025-06-19 RX ORDER — MISOPROSTOL 200 UG/1
800 TABLET ORAL AS NEEDED
Status: DISCONTINUED | OUTPATIENT
Start: 2025-06-19 | End: 2025-06-19 | Stop reason: HOSPADM

## 2025-06-19 RX ORDER — MISOPROSTOL 200 UG/1
TABLET ORAL
Status: ACTIVE
Start: 2025-06-19 | End: 2025-06-19

## 2025-06-19 RX ORDER — ONDANSETRON 4 MG/1
4 TABLET, ORALLY DISINTEGRATING ORAL EVERY 8 HOURS PRN
Status: DISCONTINUED | OUTPATIENT
Start: 2025-06-19 | End: 2025-06-20 | Stop reason: HOSPADM

## 2025-06-19 RX ORDER — FENTANYL/BUPIVACAINE/NS/PF 2-1250MCG
PLASTIC BAG, INJECTION (ML) INJECTION CONTINUOUS
Refills: 0 | Status: DISCONTINUED | OUTPATIENT
Start: 2025-06-19 | End: 2025-06-19

## 2025-06-19 RX ORDER — OXYTOCIN/0.9 % SODIUM CHLORIDE 30/500 ML
999 PLASTIC BAG, INJECTION (ML) INTRAVENOUS ONCE
Status: DISCONTINUED | OUTPATIENT
Start: 2025-06-19 | End: 2025-06-19 | Stop reason: HOSPADM

## 2025-06-19 RX ORDER — BISACODYL 10 MG
10 SUPPOSITORY, RECTAL RECTAL DAILY PRN
Status: DISCONTINUED | OUTPATIENT
Start: 2025-06-20 | End: 2025-06-20 | Stop reason: HOSPADM

## 2025-06-19 RX ORDER — HYDROCODONE BITARTRATE AND ACETAMINOPHEN 5; 325 MG/1; MG/1
1 TABLET ORAL EVERY 4 HOURS PRN
Status: DISCONTINUED | OUTPATIENT
Start: 2025-06-19 | End: 2025-06-20 | Stop reason: HOSPADM

## 2025-06-19 RX ORDER — METOCLOPRAMIDE 5 MG/1
5 TABLET ORAL EVERY 6 HOURS PRN
Status: DISCONTINUED | OUTPATIENT
Start: 2025-06-19 | End: 2025-06-20 | Stop reason: HOSPADM

## 2025-06-19 RX ORDER — METHYLERGONOVINE MALEATE 0.2 MG/ML
200 INJECTION INTRAVENOUS ONCE AS NEEDED
Status: DISCONTINUED | OUTPATIENT
Start: 2025-06-19 | End: 2025-06-19 | Stop reason: HOSPADM

## 2025-06-19 RX ORDER — PRENATAL VIT/IRON FUM/FOLIC AC 27MG-0.8MG
1 TABLET ORAL DAILY
Status: DISCONTINUED | OUTPATIENT
Start: 2025-06-19 | End: 2025-06-20 | Stop reason: HOSPADM

## 2025-06-19 RX ORDER — IBUPROFEN 600 MG/1
600 TABLET, FILM COATED ORAL EVERY 6 HOURS PRN
Status: DISCONTINUED | OUTPATIENT
Start: 2025-06-19 | End: 2025-06-19 | Stop reason: HOSPADM

## 2025-06-19 RX ORDER — FAMOTIDINE 10 MG/ML
20 INJECTION, SOLUTION INTRAVENOUS DAILY PRN
Status: DISCONTINUED | OUTPATIENT
Start: 2025-06-19 | End: 2025-06-19

## 2025-06-19 RX ORDER — FAMOTIDINE 20 MG/1
20 TABLET, FILM COATED ORAL EVERY 12 HOURS PRN
Status: DISCONTINUED | OUTPATIENT
Start: 2025-06-19 | End: 2025-06-20 | Stop reason: HOSPADM

## 2025-06-19 RX ORDER — FENTANYL/BUPIVACAINE/NS/PF 2-1250MCG
PLASTIC BAG, INJECTION (ML) INJECTION CONTINUOUS PRN
Status: DISCONTINUED | OUTPATIENT
Start: 2025-06-19 | End: 2025-06-19 | Stop reason: SURG

## 2025-06-19 RX ORDER — IBUPROFEN 600 MG/1
600 TABLET, FILM COATED ORAL EVERY 6 HOURS PRN
Status: DISCONTINUED | OUTPATIENT
Start: 2025-06-19 | End: 2025-06-20 | Stop reason: HOSPADM

## 2025-06-19 RX ORDER — ACETAMINOPHEN 325 MG/1
650 TABLET ORAL EVERY 4 HOURS PRN
Status: DISCONTINUED | OUTPATIENT
Start: 2025-06-19 | End: 2025-06-19 | Stop reason: HOSPADM

## 2025-06-19 RX ORDER — SODIUM CHLORIDE 0.9 % (FLUSH) 0.9 %
1-10 SYRINGE (ML) INJECTION AS NEEDED
Status: DISCONTINUED | OUTPATIENT
Start: 2025-06-19 | End: 2025-06-20 | Stop reason: HOSPADM

## 2025-06-19 RX ORDER — DOCUSATE SODIUM 100 MG/1
100 CAPSULE, LIQUID FILLED ORAL 2 TIMES DAILY
Status: DISCONTINUED | OUTPATIENT
Start: 2025-06-19 | End: 2025-06-20 | Stop reason: HOSPADM

## 2025-06-19 RX ORDER — HYDROCODONE BITARTRATE AND ACETAMINOPHEN 10; 325 MG/1; MG/1
1 TABLET ORAL EVERY 4 HOURS PRN
Status: DISCONTINUED | OUTPATIENT
Start: 2025-06-19 | End: 2025-06-20 | Stop reason: HOSPADM

## 2025-06-19 RX ORDER — CARBOPROST TROMETHAMINE 250 UG/ML
250 INJECTION, SOLUTION INTRAMUSCULAR AS NEEDED
Status: DISCONTINUED | OUTPATIENT
Start: 2025-06-19 | End: 2025-06-19 | Stop reason: HOSPADM

## 2025-06-19 RX ORDER — OXYTOCIN/0.9 % SODIUM CHLORIDE 30/500 ML
250 PLASTIC BAG, INJECTION (ML) INTRAVENOUS CONTINUOUS
Status: ACTIVE | OUTPATIENT
Start: 2025-06-19 | End: 2025-06-19

## 2025-06-19 RX ORDER — FERROUS SULFATE 325(65) MG
325 TABLET ORAL
Status: DISCONTINUED | OUTPATIENT
Start: 2025-06-19 | End: 2025-06-20 | Stop reason: HOSPADM

## 2025-06-19 RX ORDER — LIDOCAINE HYDROCHLORIDE AND EPINEPHRINE 10; 10 MG/ML; UG/ML
INJECTION, SOLUTION INFILTRATION; PERINEURAL AS NEEDED
Status: DISCONTINUED | OUTPATIENT
Start: 2025-06-19 | End: 2025-06-19 | Stop reason: SURG

## 2025-06-19 RX ORDER — HYDROXYZINE HYDROCHLORIDE 25 MG/1
50 TABLET, FILM COATED ORAL NIGHTLY PRN
Status: DISCONTINUED | OUTPATIENT
Start: 2025-06-19 | End: 2025-06-19 | Stop reason: HOSPADM

## 2025-06-19 RX ORDER — EPHEDRINE SULFATE 5 MG/ML
10 INJECTION INTRAVENOUS
Status: DISCONTINUED | OUTPATIENT
Start: 2025-06-19 | End: 2025-06-19 | Stop reason: HOSPADM

## 2025-06-19 RX ORDER — HYDROCORTISONE ACETATE PRAMOXINE HCL 2.5; 1 G/100G; G/100G
1 CREAM TOPICAL AS NEEDED
Status: DISCONTINUED | OUTPATIENT
Start: 2025-06-19 | End: 2025-06-20 | Stop reason: HOSPADM

## 2025-06-19 RX ORDER — OXYTOCIN/0.9 % SODIUM CHLORIDE 30/500 ML
125 PLASTIC BAG, INJECTION (ML) INTRAVENOUS ONCE AS NEEDED
Status: DISCONTINUED | OUTPATIENT
Start: 2025-06-19 | End: 2025-06-20 | Stop reason: HOSPADM

## 2025-06-19 RX ORDER — ACETAMINOPHEN 325 MG/1
650 TABLET ORAL EVERY 6 HOURS PRN
Status: DISCONTINUED | OUTPATIENT
Start: 2025-06-19 | End: 2025-06-20 | Stop reason: HOSPADM

## 2025-06-19 RX ADMIN — Medication 10 ML: at 00:15

## 2025-06-19 RX ADMIN — LIDOCAINE HYDROCHLORIDE,EPINEPHRINE BITARTRATE 3 ML: 10; .01 INJECTION, SOLUTION INFILTRATION; PERINEURAL at 09:25

## 2025-06-19 RX ADMIN — Medication 250 ML/HR: at 11:46

## 2025-06-19 RX ADMIN — Medication 10 ML/HR: at 09:35

## 2025-06-19 RX ADMIN — Medication 2 MILLI-UNITS/MIN: at 00:15

## 2025-06-19 RX ADMIN — SODIUM CHLORIDE, POTASSIUM CHLORIDE, SODIUM LACTATE AND CALCIUM CHLORIDE 125 ML/HR: 600; 310; 30; 20 INJECTION, SOLUTION INTRAVENOUS at 00:15

## 2025-06-19 RX ADMIN — SODIUM CHLORIDE, POTASSIUM CHLORIDE, SODIUM LACTATE AND CALCIUM CHLORIDE 125 ML/HR: 600; 310; 30; 20 INJECTION, SOLUTION INTRAVENOUS at 09:47

## 2025-06-19 RX ADMIN — IBUPROFEN 600 MG: 600 TABLET ORAL at 17:02

## 2025-06-19 RX ADMIN — MISOPROSTOL 800 MCG: 200 TABLET ORAL at 10:44

## 2025-06-19 RX ADMIN — WITCH HAZEL: 500 SOLUTION RECTAL; TOPICAL at 12:40

## 2025-06-19 RX ADMIN — FAMOTIDINE 20 MG: 10 INJECTION INTRAVENOUS at 07:56

## 2025-06-19 RX ADMIN — SODIUM CHLORIDE, POTASSIUM CHLORIDE, SODIUM LACTATE AND CALCIUM CHLORIDE 125 ML/HR: 600; 310; 30; 20 INJECTION, SOLUTION INTRAVENOUS at 06:53

## 2025-06-19 RX ADMIN — DOCUSATE SODIUM 100 MG: 100 CAPSULE, LIQUID FILLED ORAL at 17:02

## 2025-06-19 RX ADMIN — Medication 1 G: at 12:40

## 2025-06-19 NOTE — CASE MANAGEMENT/SOCIAL WORK
Social Work Assessment  [facility]     Patient Name: Tracy Carrington  MRN: [id number]  Today's Date: 6/19/2025    Admit Date: 6/18/2025       Discharge Plan       Row Name 06/19/25 1410       Plan    Plan Return home. Car seat must be obtained prior to discharge.    Patient/Family in Agreement with Plan yes    Plan Comments LSW met with patient and patient's significant other (Rigo ANDERSON) at bedside, introduced self/role, and reason for visit. LSW asked patient if she has a PCP for herself and pediatrician for infant. Patient stated she has both providers established. LSW inquired about insurance and patient stated she has Medicaid and infant has already been screened to be added to coverage. LSW asked about WIC and patient stated she is planning to apply for services in [facility]. LSW inquired about supplies (diapers, wipes, car seat, pack-and-play, etc.) and patient stated they have everything except a car seat. FOB stated they need assistance with finding one and they can pay half, but can't afford to purchase one. LSW discussed option of car seat ovidio and would follow up with patient at later time once determined. Patient and FOB verbalized understanding. LSW askd about current living conditions and if there are any safety concerns in the home. Patient stated she lives at home with FOB and now infant and there are no safety concerns. Patient stated infant is her third child and when asked, patient stated the other children do not live with her. LSW inquired about previous mental health services and patient stated she has participated in services before. LSW asked about medication for mental health and substance use and patient stated she was taking medication for anxiety prior to being pregnant, stopped once pregnant, and is planning to start it again since infant is delivered. Patient feels comfortable and confident in reaching out to provider to start medication again. LSW discussed signs/symptoms of  postpartum, mental health resources, and community resources ([place], [employer], Healthy Families, CHOICES, etc.) and offered patient printed materials. Patient agreeable to receiving and LSW provided all discussed information at bedside. Patient instructed on how to request a follow up from social work and patient has no further needs, aside from the car seat, at this time.             ASHLEY Queen, LSW    Phone: [phone]  Fax: [phone]  [email or web address]

## 2025-06-19 NOTE — ANESTHESIA PROCEDURE NOTES
Labor Epidural    Pre-sedation assessment completed: 6/19/2025 9:12 AM    Patient reassessed immediately prior to procedure    Patient location during procedure: OB  Start Time: 6/19/2025 9:13 AM  Stop Time: 6/19/2025 9:35 AM  Indication:at surgeon's request  Performed By  Anesthesiologist: Tushar Bonner MD CRNA/CAA: Delaney Mcfarland CRNA  Preanesthetic Checklist  Completed: patient identified, IV checked, site marked, risks and benefits discussed, surgical consent, monitors and equipment checked, pre-op evaluation and timeout performed  Additional Notes  Dpe   Prep:  Pt Position:sitting  Sterile Tech:cap, gloves, mask and sterile barrier  Prep:chlorhexidine gluconate and isopropyl alcohol  Monitoring:blood pressure monitoring and continuous pulse oximetry  Epidural Block Procedure:  Approach:midline  Guidance:landmark technique and palpation technique  Location:L4-L5  Needle Type:Tuohy  Needle Gauge:17 G  Loss of Resistance Medium: saline  Loss of Resistance: 7cm  Cath Depth at skin:12 cm  Paresthesia: none  Aspiration:negative  Test Dose:negative  Number of Attempts: 1  Post Assessment:  Dressing:occlusive dressing applied and secured with tape  Pt Tolerance:patient tolerated the procedure well with no apparent complications  Complications:no

## 2025-06-19 NOTE — H&P
JORDI Rich  Obstetric History and Physical     Chief Complaint: IOL    Subjective     Patient is a 20 y.o. female  currently at 40w5d, who presents for IOL. She reports +FM, -LOF, VB, or CTX.    Pt of Dr. Andre    Pregnancy c/b:  Tobacco use in preg  Short interval pregnancy  EFW 41%ile  GBS Neg    Prenatal Information:  Prenatal Results       Initial Prenatal Labs       Test Value Reference Range Date Time    Hemoglobin  10.5 g/dL 12.0 - 15.9 25 0929    Hematocrit  31.6 % 34.0 - 46.6 25 0929    Platelets  262 10*3/mm3 140 - 450 25 0929    Rubella IgG        Hepatitis B SAg ^ Negative   24     Hepatitis C Ab ^ Non-Reactive   24     RPR        T. Pallidum Ab  ^ Negative   24     ABO  O   25    Rh  Positive   25    Antibody Screen        HIV  Non-Reactive  Non-Reactive 25    Urine Culture  No growth   25 1802    Gonorrhea        Chlamydia        TSH        HgB A1c         Varicella IgG        Hemoglobinopathy Fractionation        Hemoglobinopathy (genetic testing)        Cystic fibrosis         Spinal muscular atrophy        Fragile X                  Fetal testing        Test Value Reference Range Date Time    NIPT        MSAFP        AFP-4                  2nd and 3rd Trimester       Test Value Reference Range Date Time    Hemoglobin (repeated)  9.5 g/dL 12.0 - 15.9 25       8.7 g/dL 12.0 - 15.9 04/10/25 0529       10.8 g/dL 12.0 - 15.9 25 1716    Hematocrit (repeated)  30.3 % 34.0 - 46.6 25 235       27.3 % 34.0 - 46.6 04/10/25 0529       33.6 % 34.0 - 46.6 25 1716    Platelets   277 10*3/mm3 140 - 450 25 2359       237 10*3/mm3 140 - 450 04/10/25 0529       298 10*3/mm3 140 - 450 25 1716       262 10*3/mm3 140 - 450 25 0929    1 hour GTT         Antibody Screen (repeated)  Negative   25 235    3rd TM syphilis scrn (repeated)  RPR         3rd TM syphilis scrn (repeated) TP-Ab         3rd TM syphilis screen TB-Ab (FTA)        Syphilis cascade test TP-Ab (EIA)        Syphilis cascade TPPA        GTT Fasting        GTT 1 Hr        GTT 2 Hr        GTT 3 Hr        Group B Strep ^ NEG   05/22/25               Other testing        Test Value Reference Range Date Time    Parvo IgG         CMV IgG                   Drug Screening       Test Value Reference Range Date Time    Amphetamine Screen        Barbiturate Screen        Benzodiazepine Screen        Methadone Screen        Phencyclidine Screen        Opiates Screen        THC Screen        Cocaine Screen        Propoxyphene Screen        Buprenorphine Screen        Methamphetamine Screen        Oxycodone Screen        Tricyclic Antidepressants Screen                  Legend    ^: Historical                          External Prenatal Results       Pregnancy Outside Results - Transcribed From Office Records - See Scanned Records For Details       Test Value Date Time    ABO  O  06/18/25 2359    Rh  Positive  06/18/25 2359    Antibody Screen  Negative  06/18/25 2359    Varicella IgG       Rubella       Hgb  9.5 g/dL 06/18/25 2359       8.7 g/dL 04/10/25 0529       10.8 g/dL 04/09/25 1716       10.5 g/dL 02/06/25 0929    Hct  30.3 % 06/18/25 2359       27.3 % 04/10/25 0529       33.6 % 04/09/25 1716       31.6 % 02/06/25 0929    HgB A1c        1h GTT       3h GTT Fasting       3h GTT 1 hour       3h GTT 2 hour       3h GTT 3 hour        Gonorrhea (discrete)       Chlamydia (discrete)       RPR       Syphils cascade: TP-Ab (FTA) ^ Negative  12/17/24     TP-Ab ^ Negative  12/17/24     TP-Ab (EIA)       TPPA       HBsAg ^ Negative  12/17/24     Herpes Simplex Virus PCR       Herpes Simplex VIrus Culture       HIV  Non-Reactive  06/18/25 2359    Hep C RNA Quant PCR       Hep C Antibody ^ Non-Reactive  12/17/24     AFP       NIPT       Cystic Fibrosis (Lenore)       Cystic Fibroisis        Spinal Muscular atrophy       Fragile X       Group B Strep ^ NEG   25     GBS Susceptibility to Clindamycin       GBS Susceptibility to Erythromycin       Fetal Fibronectin       Genetic Testing, Maternal Blood                 Drug Screening       Test Value Date Time    Urine Drug Screen       Amphetamine Screen       Barbiturate Screen       Benzodiazepine Screen       Methadone Screen       Phencyclidine Screen       Opiates Screen       THC Screen       Cocaine Screen       Propoxyphene Screen       Buprenorphine Screen       Methamphetamine Screen       Oxycodone Screen       Tricyclic Antidepressants Screen                 Legend    ^: Historical                             Past OB History:      OB History    Para Term  AB Living   6 2 2 0 2 1   SAB IAB Ectopic Molar Multiple Live Births   2     1      # Outcome Date GA Lbr Alfonso/2nd Weight Sex Type Anes PTL Lv   6 Current            5 Term 24           4 Term 21 38w0d  2608 g (5 lb 12 oz) F Vag-Spont EPI  MANDO   3 SAB 2020 12w0d          2 2018 6w0d          1                      Past Medical History: Past Medical History:   Diagnosis Date    ADHD (attention deficit hyperactivity disorder)     Anxiety disorder     Asthma     resolved    Depression     Seasonal allergies         Past Surgical History Past Surgical History:   Procedure Laterality Date    EAR TUBES      ENDOSCOPY      TONSILLECTOMY          Family History: Family History   Problem Relation Age of Onset    Diabetes Mother     Hypertension Mother     Diabetes Father     Hypertension Father     Diabetes Maternal Grandmother     Hypertension Maternal Grandmother     Diabetes Paternal Grandmother     Hypertension Paternal Grandfather     Leukemia Paternal Grandfather       Social History:  reports that she has been smoking cigarettes. She has a 1.3 pack-year smoking history. She has never been exposed to tobacco smoke. She has never used smokeless tobacco.   reports that she does not currently use alcohol.   reports  that she does not currently use drugs after having used the following drugs: Marijuana.        General ROS: Pertinent items are noted in HPI    Objective      Vitals:     Vitals:    06/19/25 0530 06/19/25 0600 06/19/25 0630 06/19/25 0700   BP: 112/68 108/70 120/68 123/73   Pulse: 81 86 77 89   Temp:  98 °F (36.7 °C)     TempSrc:  Oral     SpO2: 98% 98% 98% 99%   Weight:       Height:           Fetal Heart Rate Assessment:   110s/mod camelia/+accels, no decels    [place]:   0-1/10     Physical Exam:      Physical Exam  Constitutional:       Appearance: Normal appearance.   HENT:      Head: Normocephalic.   Eyes:      Extraocular Movements: Extraocular movements intact.      Pupils: Pupils are equal, round, and reactive to light.   Cardiovascular:      Rate and Rhythm: Normal rate.   Pulmonary:      Effort: Pulmonary effort is normal.   Abdominal:      Palpations: Abdomen is soft.      Tenderness: There is no abdominal tenderness. There is no guarding.   Musculoskeletal:         General: Normal range of motion.      Cervical back: Normal range of motion.   Neurological:      General: No focal deficit present.      Mental Status: She is alert and oriented to person, place, and time.   Skin:     General: Skin is warm and dry.   Psychiatric:         Mood and Affect: Mood normal.         Behavior: Behavior normal.   Vitals reviewed.             Laboratory Results:   Lab Results (last 48 hours)       Procedure Component Value Units Date/Time    LABS SCANNED [[id number]] Resulted: 06/18/25     Updated: 06/19/25 0749    LABS SCANNED [[id number]] Resulted: 06/18/25     Updated: 06/19/25 0745    Treponema pallidum AB w/Reflex RPR [[id number]] Resulted: 12/17/24    Specimen: Blood Updated: 06/19/25 0430     Treponema pallidum Antibodies Negative    Hepatitis C Antibody [[id number]] Resulted: 12/17/24    Specimen: Blood Updated: 06/19/25 0430     External Hepatitis C Ab Non-Reactive    Hepatitis B Surface Antigen [[id number]] Resulted:  12/17/24    Specimen: Blood Updated: 06/19/25 0430     External Hepatitis B Surface Ag Negative    Group B Streptococcus Culture - Swab, Vaginal/Rectum [[id number]] Resulted: 05/22/25    Specimen: Swab from Vaginal/Rectum Updated: 06/19/25 0429     External Strep Group B Ag NEG    CBC & Differential [[id number]]  (Abnormal) Collected: 06/18/25 2359    Specimen: Blood Updated: 06/19/25 0152    Narrative:      The following orders were created for panel order CBC & Differential.  Procedure                               Abnormality         Status                     ---------                               -----------         ------                     CBC Auto Differential[[id number]]        Abnormal            Final result               Scan Slide[[id number]]                                                                    Please view results for these tests on the individual orders.    CBC Auto Differential [[id number]]  (Abnormal) Collected: 06/18/25 2359    Specimen: Blood Updated: 06/19/25 0152     WBC 10.83 10*3/mm3      RBC 3.66 10*6/mm3      Hemoglobin 9.5 g/dL      Hematocrit 30.3 %      MCV 82.8 fL      MCH 26.0 pg      MCHC 31.4 g/dL      RDW 15.4 %      RDW-SD 46.4 fl      MPV 10.6 fL      Platelets 277 10*3/mm3      Neutrophil % 74.9 %      Lymphocyte % 17.7 %      Monocyte % 6.3 %      Eosinophil % 0.4 %      Basophil % 0.2 %      Immature Grans % 0.5 %      Neutrophils, Absolute 8.12 10*3/mm3      Lymphocytes, Absolute 1.92 10*3/mm3      Monocytes, Absolute 0.68 10*3/mm3      Eosinophils, Absolute 0.04 10*3/mm3      Basophils, Absolute 0.02 10*3/mm3      Immature Grans, Absolute 0.05 10*3/mm3      nRBC 0.0 /100 WBC     HIV-1 & HIV-2 Antibodies [[id number]]  (Normal) Collected: 06/18/25 2359    Specimen: Blood Updated: 06/19/25 0128    Narrative:      The following orders were created for panel order HIV-1 & HIV-2 Antibodies.  Procedure                               Abnormality         Status                      ---------                               -----------         ------                     HIV-1 / O / 2 Ag / Antibody[[id number]]  Normal              Final result                 Please view results for these tests on the individual orders.    HIV-1 / O / 2 Ag / Antibody [[id number]]  (Normal) Collected: 25    Specimen: Blood Updated: 25 012     HIV DUO Non-Reactive    Narrative:      The HIV antibody/antigen combo assay is a qualitative assay for HIV that includes the p24 antigen as well as antibodies to HIV types 1 and 2. This test is intended to be used as a screening assay in the diagnosis of HIV infection in patients over the age of 2.    Treponema pallidum AB w/Reflex RPR [[id number]] Collected: 25    Specimen: Blood Updated: 25 0056            Other Studies:      Assessment & Plan     Principal Problem:    Term pregnancy         Assessment:  Intrauterine pregnancy at 40w5d gestation with reactive, reassuring fetal status.    Pregnancy c/b:  Tobacco use in preg  Short interval pregnancy  EFW 41%ile    GBS status:   External Strep Group B Ag   Date Value Ref Range Status   2025 NEG  Final       Plan:  Admit labs  CEFM/TOCO  Fetal status reassuring at this time  CLD, IVF  Induction:  Continue Pitocin and titrate per protocol  Anticipate    All questions have been answered.       Veronica Andre MD   2025   08:00 EDT

## 2025-06-19 NOTE — ANESTHESIA POSTPROCEDURE EVALUATION
Patient: Tracy Carrington    Procedure Summary       Date: 06/19/25 Room / Location:     Anesthesia Start: 0912 Anesthesia Stop: 1037    Procedure: LABOR ANALGESIA Diagnosis:     Scheduled Providers:  Provider: Tushar Bonner MD    Anesthesia Type: epidural ASA Status: 2            Anesthesia Type: epidural    Vitals  Vitals Value Taken Time   /88 06/19/25 15:25   Temp 97.9 °F (36.6 °C) 06/19/25 15:25   Pulse 106 06/19/25 15:25   Resp 18 06/19/25 15:25   SpO2 98 % 06/19/25 15:25           Post Anesthesia Care and Evaluation    Patient location during evaluation: PACU  Patient participation: complete - patient participated  Level of consciousness: awake  Pain scale: See nurse's notes for pain score.  Pain management: adequate    Airway patency: patent  Anesthetic complications: No anesthetic complications  PONV Status: none  Cardiovascular status: acceptable  Respiratory status: acceptable and spontaneous ventilation  Hydration status: acceptable  Post Neuraxial Block status: Motor and sensory function returned to baseline and No signs or symptoms of PDPH  Comments: Patient seen and examined postoperatively; vital signs stable; SpO2 greater than or equal to 90%; cardiopulmonary status stable; nausea/vomiting adequately controlled; pain adequately controlled; no apparent anesthesia complications; patient discharged from anesthesia care when discharge criteria were met

## 2025-06-19 NOTE — ANESTHESIA PREPROCEDURE EVALUATION
Anesthesia Evaluation     Patient summary reviewed and Nursing notes reviewed   NPO Solid Status: N/A  NPO Liquid Status: N/A           Airway   Dental - normal exam     Pulmonary - normal exam   (+) asthma,  Cardiovascular - negative cardio ROS    Rhythm: regular  Rate: normal        Neuro/Psych  (+) dizziness/light headedness, psychiatric history Anxiety  GI/Hepatic/Renal/Endo - negative ROS     Musculoskeletal     Abdominal    Substance History       Comment: smoker   OB/GYN    (+) Pregnant        Other        ROS/Med Hx Other: Plt 277              Anesthesia Plan    ASA 2     epidural       Anesthetic plan, risks, benefits, and alternatives have been provided, discussed and informed consent has been obtained with: patient and spouse/significant other.  Pre-procedure education provided  Plan discussed with CRNA.    CODE STATUS:    Code Status (Patient has no pulse and is not breathing): CPR (Attempt to Resuscitate)  Medical Interventions (Patient has pulse or is breathing): Full Support  Level Of Support Discussed With: Patient

## 2025-06-19 NOTE — PROGRESS NOTES
"[facility]  Obstetric Progress Note    Subjective   Tracy Carrington is doing well with no complaints or concerns. She reports feeling more discomfort w/ CTX every 5 mins. -LOF or VB.    Objective     Vitals:  Vitals:    25 0530 25 0600 25 0630 25 0700   BP: 112/68 108/70 120/68 123/73   Pulse: 81 86 77 89   Temp:  98 °F (36.7 °C)     TempSrc:  Oral     SpO2: 98% 98% 98% 99%   Weight:       Height:         Flowsheet Rows      Flowsheet Row First Filed Value   Admission Height 160 cm (63\") Documented at 2025   Admission Weight 96.5 kg (212 lb 11.9 oz) Documented at 2025          No intake or output data in the 24 hours ending 25 0824    Fetal Heart Rate Assessment:   110s/moderate variability/+accelerations, no decelerations  [place]:  External monitor not well picking up    Physical Exam:  General: Patient is in no acute distress    Pelvic Exam: was checked (by me): 4 cm / 75% % / -1 and AROM- Clear            Assessment & Plan     Principal Problem:    Term pregnancy         Assessment:  Intrauterine pregnancy at 40w5d gestation here for IOL.    Pregnancy c/b:  Tobacco use in preg  Short interval pregnancy  EFW 41%ile  GBS Neg    Plan:  CEFM/TOCO  Fetal status reassuring at this time  CLD, IVF  Induction:  Continue Pitocin and titrate per protocol  AROMed this check with clear fluid with good descent of fetal head against cervix.  Anticipate    All questions have been answered.        Veronica Andre MD  2025  08:24 EDT    "

## 2025-06-19 NOTE — PLAN OF CARE
Goal Outcome Evaluation:            Patient delivered a viable infant male at 1037, apgar 8 & 9. Fundus is firm, bleeding is scant to moderate. Bonding well with infant. Plans to breast and bottle feeding. Pain is well controlled. S/o at bedside. Pt verbalizes understanding of edu given

## 2025-06-20 VITALS
RESPIRATION RATE: 17 BRPM | TEMPERATURE: 98.1 F | OXYGEN SATURATION: 99 % | SYSTOLIC BLOOD PRESSURE: 115 MMHG | BODY MASS INDEX: 37.05 KG/M2 | DIASTOLIC BLOOD PRESSURE: 41 MMHG | HEIGHT: 63 IN | HEART RATE: 104 BPM | WEIGHT: 209.13 LBS

## 2025-06-20 LAB
BASOPHILS # BLD AUTO: 0.03 10*3/MM3 (ref 0–0.2)
BASOPHILS NFR BLD AUTO: 0.3 % (ref 0–1.5)
DEPRECATED RDW RBC AUTO: 46.3 FL (ref 37–54)
EOSINOPHIL # BLD AUTO: 0.12 10*3/MM3 (ref 0–0.4)
EOSINOPHIL NFR BLD AUTO: 1.1 % (ref 0.3–6.2)
ERYTHROCYTE [DISTWIDTH] IN BLOOD BY AUTOMATED COUNT: 15 % (ref 12.3–15.4)
HCT VFR BLD AUTO: 30.8 % (ref 34–46.6)
HGB BLD-MCNC: 9.5 G/DL (ref 12–15.9)
IMM GRANULOCYTES # BLD AUTO: 0.06 10*3/MM3 (ref 0–0.05)
IMM GRANULOCYTES NFR BLD AUTO: 0.6 % (ref 0–0.5)
LYMPHOCYTES # BLD AUTO: 2.71 10*3/MM3 (ref 0.7–3.1)
LYMPHOCYTES NFR BLD AUTO: 24.9 % (ref 19.6–45.3)
MCH RBC QN AUTO: 26 PG (ref 26.6–33)
MCHC RBC AUTO-ENTMCNC: 30.8 G/DL (ref 31.5–35.7)
MCV RBC AUTO: 84.2 FL (ref 79–97)
MONOCYTES # BLD AUTO: 0.78 10*3/MM3 (ref 0.1–0.9)
MONOCYTES NFR BLD AUTO: 7.2 % (ref 5–12)
NEUTROPHILS NFR BLD AUTO: 65.9 % (ref 42.7–76)
NEUTROPHILS NFR BLD AUTO: 7.2 10*3/MM3 (ref 1.7–7)
NRBC BLD AUTO-RTO: 0 /100 WBC (ref 0–0.2)
PLATELET # BLD AUTO: 211 10*3/MM3 (ref 140–450)
PMV BLD AUTO: 10.2 FL (ref 6–12)
RBC # BLD AUTO: 3.66 10*6/MM3 (ref 3.77–5.28)
WBC NRBC COR # BLD AUTO: 10.9 10*3/MM3 (ref 3.4–10.8)

## 2025-06-20 PROCEDURE — 85025 COMPLETE CBC W/AUTO DIFF WBC: CPT | Performed by: STUDENT IN AN ORGANIZED HEALTH CARE EDUCATION/TRAINING PROGRAM

## 2025-06-20 RX ORDER — IBUPROFEN 600 MG/1
600 TABLET, FILM COATED ORAL EVERY 6 HOURS PRN
Qty: 30 TABLET | Refills: 0 | Status: SHIPPED | OUTPATIENT
Start: 2025-06-20

## 2025-06-20 RX ADMIN — IBUPROFEN 600 MG: 600 TABLET ORAL at 06:06

## 2025-06-20 RX ADMIN — HYDROCODONE BITARTRATE AND ACETAMINOPHEN 1 TABLET: 5; 325 TABLET ORAL at 09:18

## 2025-06-20 RX ADMIN — DOCUSATE SODIUM 100 MG: 100 CAPSULE, LIQUID FILLED ORAL at 09:13

## 2025-06-20 RX ADMIN — FERROUS SULFATE TAB 325 MG (65 MG ELEMENTAL FE) 325 MG: 325 (65 FE) TAB at 09:13

## 2025-06-20 RX ADMIN — IBUPROFEN 600 MG: 600 TABLET ORAL at 20:32

## 2025-06-20 RX ADMIN — PRENATAL VITAMINS-IRON FUMARATE 27 MG IRON-FOLIC ACID 0.8 MG TABLET 1 TABLET: at 09:13

## 2025-06-20 RX ADMIN — DOCUSATE SODIUM 100 MG: 100 CAPSULE, LIQUID FILLED ORAL at 20:32

## 2025-06-20 RX ADMIN — ACETAMINOPHEN 650 MG: 325 TABLET, FILM COATED ORAL at 00:11

## 2025-06-20 NOTE — DISCHARGE SUMMARY
[facility]  Delivery Discharge Summary    Primary OB Clinician: Veronica Andre MD    Admission Diagnosis:  Active Problems:     (normal spontaneous vaginal delivery)      Discharge Diagnosis:  delivered    Gestational Age: 40w5d    Date of Delivery: 2025    Delivered By:  Veronica Andre    Delivery Type: Vaginal, Spontaneous     Tubal Ligation: n/a    Intrapartum Course: Uncomplicated delivery.     Postpartum Course:  Pt was admitted and underwent  Spontaneous Vaginal Delivery. Pt was transferred to PP where she had an uncomplicated course. Pt remained AFVSS, had scant lochia and pain was well controlled. Pt d/c home in stable condition and will f/u in office for PP visit as scheduled or PRN. Currently bottle feeding. Plans on none  for contraception. Anemia continues, asymptomatic - continue PO iron as directed    Physical Exam:    Vitals:   Vitals:    25 2133 25 2309 25 0305 25 0750   BP:  136/86 107/62 103/69   BP Location:  Left arm Left arm Right arm   Patient Position:  Sitting Lying Lying   Pulse:  94 83 75   Resp:  17 16 18   Temp:  97.5 °F (36.4 °C) 97.6 °F (36.4 °C) 97.6 °F (36.4 °C)   TempSrc:  Oral Oral Oral   SpO2:  99% 97% 97%   Weight: 94.9 kg (209 lb 2 oz)      Height:         Temp (24hrs), Av.8 °F (36.6 °C), Min:97.5 °F (36.4 °C), Max:98.2 °F (36.8 °C)      General Appearance:    Alert, cooperative, in no acute distress   Abdomen:     Soft non-tender, non-distended, no guarding, no rebound         tenderness.   Extremities:   Moves all extremities well, no edema, no cyanosis, no              Redness.   Incision:  N/A   Fundus:   Firm, below umbilicus     Feeding method: Breastfeeding Status: No    Labs:  Results from last 7 days   Lab Units 25  0518 25  2359   WBC 10*3/mm3 10.90* 10.83*   HEMOGLOBIN g/dL 9.5* 9.5*   HEMATOCRIT % 30.8* 30.3*   PLATELETS 10*3/mm3 211 277           Blood Type: RH Positive      Plan:  Discharge to home.     Follow-up appointment with dr Andre in 6 weeks.   All discharge instructions were reviewed with pt including bleeding warnings, s/sx of PP depression, and warning signs in the PP period for which to seek medical attention including but not limited to s/sx of hypertension and thromboembolism.     Soumya Garcia NP  6/20/2025  09:11 EDT

## 2025-06-20 NOTE — CASE MANAGEMENT/SOCIAL WORK
Social Work Assessment  [facility]     Patient Name: Tracy Carrington  MRN: [id number]  Today's Date: 6/20/2025    Admit Date: 6/18/2025     Discharge Plan       Row Name 06/20/25 1632       Plan    Plan Anticipate routine home at discharge.    Plan Comments LSW had follow-up visit with patient regarding car seat need. Unable to obtain one. Reviewed child passenger safety release form. Patient signed. Unopened/new car seat left at bedside. Patient inquired about a pack-n-play and LSW encouraged her to discuss with [facility] services vs. [facility] may be able to assist locally as well as [facility] was discussed. No additional needs.                  NASEEM Murillo, LSW, [facility]  Lead   Phone: [phone]  Cell: [phone]  Fax: [phone]  [email or web address]

## 2025-06-20 NOTE — NURSING NOTE
Left message with Social service to come see pt as per pt request, pt needing assistance with a car seat.

## 2025-06-21 ENCOUNTER — MATERNAL SCREENING (OUTPATIENT)
Dept: CALL CENTER | Facility: HOSPITAL | Age: 20
End: 2025-06-21
Payer: OTHER GOVERNMENT

## 2025-06-21 NOTE — OUTREACH NOTE
Maternal Screening Survey      Flowsheet Row Responses   Eligibility Eligible   Prep survey completed? Yes   Facility patient discharged from? [facility] Registered Nurse              [facility] - Registered Nurse

## 2025-06-23 ENCOUNTER — APPOINTMENT (OUTPATIENT)
Dept: CT IMAGING | Facility: HOSPITAL | Age: 20
End: 2025-06-23
Payer: OTHER GOVERNMENT

## 2025-06-23 ENCOUNTER — HOSPITAL ENCOUNTER (EMERGENCY)
Facility: HOSPITAL | Age: 20
Discharge: HOME | End: 2025-06-23
Admitting: EMERGENCY MEDICINE
Payer: OTHER GOVERNMENT

## 2025-06-23 VITALS
WEIGHT: 201.94 LBS | TEMPERATURE: 98 F | RESPIRATION RATE: 17 BRPM | DIASTOLIC BLOOD PRESSURE: 69 MMHG | SYSTOLIC BLOOD PRESSURE: 114 MMHG | HEART RATE: 66 BPM | OXYGEN SATURATION: 97 % | BODY MASS INDEX: 35.78 KG/M2 | HEIGHT: 63 IN

## 2025-06-23 DIAGNOSIS — G43.809 OTHER MIGRAINE WITHOUT STATUS MIGRAINOSUS, NOT INTRACTABLE: Primary | ICD-10-CM

## 2025-06-23 LAB
ANION GAP SERPL CALCULATED.3IONS-SCNC: 11.2 MMOL/L (ref 5–15)
BACTERIA UR QL AUTO: ABNORMAL /HPF
BASOPHILS # BLD AUTO: 0.03 10*3/MM3 (ref 0–0.2)
BASOPHILS NFR BLD AUTO: 0.3 % (ref 0–1.5)
BILIRUB UR QL STRIP: NEGATIVE
BUN SERPL-MCNC: 8.9 MG/DL (ref 6–20)
BUN/CREAT SERPL: 14.6 (ref 7–25)
CALCIUM SPEC-SCNC: 9.2 MG/DL (ref 8.6–10.5)
CHLORIDE SERPL-SCNC: 108 MMOL/L (ref 98–107)
CLARITY UR: ABNORMAL
CO2 SERPL-SCNC: 20.8 MMOL/L (ref 22–29)
COLOR UR: YELLOW
CREAT SERPL-MCNC: 0.61 MG/DL (ref 0.57–1)
DEPRECATED RDW RBC AUTO: 46.8 FL (ref 37–54)
EGFRCR SERPLBLD CKD-EPI 2021: 129.8 ML/MIN/1.73
EOSINOPHIL # BLD AUTO: 0.17 10*3/MM3 (ref 0–0.4)
EOSINOPHIL NFR BLD AUTO: 1.8 % (ref 0.3–6.2)
ERYTHROCYTE [DISTWIDTH] IN BLOOD BY AUTOMATED COUNT: 15.5 % (ref 12.3–15.4)
GLUCOSE SERPL-MCNC: 109 MG/DL (ref 65–99)
GLUCOSE UR STRIP-MCNC: NEGATIVE MG/DL
HCT VFR BLD AUTO: 32 % (ref 34–46.6)
HGB BLD-MCNC: 9.7 G/DL (ref 12–15.9)
HGB UR QL STRIP.AUTO: ABNORMAL
HYALINE CASTS UR QL AUTO: ABNORMAL /LPF
IMM GRANULOCYTES # BLD AUTO: 0.08 10*3/MM3 (ref 0–0.05)
IMM GRANULOCYTES NFR BLD AUTO: 0.9 % (ref 0–0.5)
KETONES UR QL STRIP: ABNORMAL
LEUKOCYTE ESTERASE UR QL STRIP.AUTO: ABNORMAL
LYMPHOCYTES # BLD AUTO: 1.78 10*3/MM3 (ref 0.7–3.1)
LYMPHOCYTES NFR BLD AUTO: 19 % (ref 19.6–45.3)
MCH RBC QN AUTO: 25.6 PG (ref 26.6–33)
MCHC RBC AUTO-ENTMCNC: 30.3 G/DL (ref 31.5–35.7)
MCV RBC AUTO: 84.4 FL (ref 79–97)
MONOCYTES # BLD AUTO: 0.46 10*3/MM3 (ref 0.1–0.9)
MONOCYTES NFR BLD AUTO: 4.9 % (ref 5–12)
NEUTROPHILS NFR BLD AUTO: 6.87 10*3/MM3 (ref 1.7–7)
NEUTROPHILS NFR BLD AUTO: 73.1 % (ref 42.7–76)
NITRITE UR QL STRIP: NEGATIVE
NRBC BLD AUTO-RTO: 0 /100 WBC (ref 0–0.2)
PH UR STRIP.AUTO: 7 [PH] (ref 5–8)
PLATELET # BLD AUTO: 266 10*3/MM3 (ref 140–450)
PMV BLD AUTO: 9.5 FL (ref 6–12)
POTASSIUM SERPL-SCNC: 4 MMOL/L (ref 3.5–5.2)
PROT UR QL STRIP: NEGATIVE
RBC # BLD AUTO: 3.79 10*6/MM3 (ref 3.77–5.28)
RBC # UR STRIP: ABNORMAL /HPF
REF LAB TEST METHOD: ABNORMAL
SODIUM SERPL-SCNC: 140 MMOL/L (ref 136–145)
SP GR UR STRIP: 1.02 (ref 1–1.03)
SQUAMOUS #/AREA URNS HPF: ABNORMAL /HPF
UROBILINOGEN UR QL STRIP: ABNORMAL
WBC # UR STRIP: ABNORMAL /HPF
WBC NRBC COR # BLD AUTO: 9.39 10*3/MM3 (ref 3.4–10.8)

## 2025-06-23 PROCEDURE — 81001 URINALYSIS AUTO W/SCOPE: CPT

## 2025-06-23 PROCEDURE — 70450 CT HEAD/BRAIN W/O DYE: CPT

## 2025-06-23 PROCEDURE — 25010000002 DEXAMETHASONE PER 1 MG

## 2025-06-23 PROCEDURE — 96374 THER/PROPH/DIAG INJ IV PUSH: CPT

## 2025-06-23 PROCEDURE — 96375 TX/PRO/DX INJ NEW DRUG ADDON: CPT

## 2025-06-23 PROCEDURE — 25010000002 PROCHLORPERAZINE 10 MG/2ML SOLUTION

## 2025-06-23 PROCEDURE — 85025 COMPLETE CBC W/AUTO DIFF WBC: CPT

## 2025-06-23 PROCEDURE — 25010000002 KETOROLAC TROMETHAMINE PER 15 MG

## 2025-06-23 PROCEDURE — 99284 EMERGENCY DEPT VISIT MOD MDM: CPT

## 2025-06-23 PROCEDURE — 80048 BASIC METABOLIC PNL TOTAL CA: CPT

## 2025-06-23 PROCEDURE — 25810000003 SODIUM CHLORIDE 0.9 % SOLUTION

## 2025-06-23 PROCEDURE — 87086 URINE CULTURE/COLONY COUNT: CPT

## 2025-06-23 PROCEDURE — 25010000002 DIPHENHYDRAMINE PER 50 MG

## 2025-06-23 RX ORDER — DIPHENHYDRAMINE HYDROCHLORIDE 50 MG/ML
25 INJECTION, SOLUTION INTRAMUSCULAR; INTRAVENOUS ONCE
Status: COMPLETED | OUTPATIENT
Start: 2025-06-23 | End: 2025-06-23

## 2025-06-23 RX ORDER — PROCHLORPERAZINE EDISYLATE 5 MG/ML
5 INJECTION INTRAMUSCULAR; INTRAVENOUS ONCE
Status: COMPLETED | OUTPATIENT
Start: 2025-06-23 | End: 2025-06-23

## 2025-06-23 RX ORDER — DEXAMETHASONE SODIUM PHOSPHATE 4 MG/ML
10 INJECTION, SOLUTION INTRA-ARTICULAR; INTRALESIONAL; INTRAMUSCULAR; INTRAVENOUS; SOFT TISSUE ONCE
Status: COMPLETED | OUTPATIENT
Start: 2025-06-23 | End: 2025-06-23

## 2025-06-23 RX ORDER — KETOROLAC TROMETHAMINE 30 MG/ML
15 INJECTION, SOLUTION INTRAMUSCULAR; INTRAVENOUS ONCE
Status: COMPLETED | OUTPATIENT
Start: 2025-06-23 | End: 2025-06-23

## 2025-06-23 RX ORDER — SODIUM CHLORIDE 0.9 % (FLUSH) 0.9 %
10 SYRINGE (ML) INJECTION AS NEEDED
Status: DISCONTINUED | OUTPATIENT
Start: 2025-06-23 | End: 2025-06-24 | Stop reason: HOSPADM

## 2025-06-23 RX ADMIN — PROCHLORPERAZINE EDISYLATE 5 MG: 5 INJECTION INTRAMUSCULAR; INTRAVENOUS at 21:45

## 2025-06-23 RX ADMIN — DIPHENHYDRAMINE HYDROCHLORIDE 25 MG: 50 INJECTION, SOLUTION INTRAMUSCULAR; INTRAVENOUS at 21:45

## 2025-06-23 RX ADMIN — KETOROLAC TROMETHAMINE 15 MG: 30 INJECTION INTRAMUSCULAR; INTRAVENOUS at 21:45

## 2025-06-23 RX ADMIN — SODIUM CHLORIDE 1000 ML: 0.9 INJECTION, SOLUTION INTRAVENOUS at 21:46

## 2025-06-23 RX ADMIN — DEXAMETHASONE SODIUM PHOSPHATE 10 MG: 4 INJECTION, SOLUTION INTRAMUSCULAR; INTRAVENOUS at 21:45

## 2025-06-23 NOTE — ED PROVIDER NOTES
Subjective     Provider in Triage Note  Due to significant overcrowding in the emergency department patient was initially seen and evaluated in triage.  Provider in triage recommended patient placement in the treatment area to initiate therapy and movement to an ER bed as soon as possible.    Patient is a 20-year-old female presents by private vehicle for delivery on 6/19/2025 states she did have an epidural.  States that there were complications during the procedure.  Complains of a posterior headache that radiates anteriorly stating it is the worst of her life.  The pain is not relieved with lying flat.  States the only time she gets relief is when she is sleeping.     History of Present Illness  Chief Complaint   Patient presents with    Headache     Reviewed provider in triage note as above and agree.  Review of Systems   Neurological:  Positive for headaches.   All other systems reviewed and are negative.      Past Medical History:   Diagnosis Date    ADHD (attention deficit hyperactivity disorder)     Anxiety disorder     Asthma     resolved    Depression     Seasonal allergies        No Known Allergies    Past Surgical History:   Procedure Laterality Date    EAR TUBES      ENDOSCOPY      TONSILLECTOMY         Family History   Problem Relation Age of Onset    Diabetes Mother     Hypertension Mother     Diabetes Father     Hypertension Father     Diabetes Maternal Grandmother     Hypertension Maternal Grandmother     Diabetes Paternal Grandmother     Hypertension Paternal Grandfather     Leukemia Paternal Grandfather        Social History     Socioeconomic History    Marital status: Significant Other   Tobacco Use    Smoking status: Every Day     Current packs/day: 0.25     Average packs/day: 0.3 packs/day for 5.0 years (1.3 ttl pk-yrs)     Types: Cigarettes     Passive exposure: Never    Smokeless tobacco: Never    Tobacco comments:     Quit E Cig- middle of 2019/ Smokes 3 cigs per day   Vaping Use    Vaping  "status: Some Days    Substances: Nicotine   Substance and Sexual Activity    Alcohol use: Not Currently    Drug use: Not Currently     Types: Marijuana     Comment: quit with +UPT    Sexual activity: Defer           Objective   Physical Exam  Vitals and nursing note reviewed.   Constitutional:       General: She is not in acute distress.     Appearance: Normal appearance. She is obese. She is not ill-appearing, toxic-appearing or diaphoretic.   HENT:      Head: Normocephalic and atraumatic.      Right Ear: Ear canal and external ear normal. A middle ear effusion is present.      Left Ear: Ear canal and external ear normal. A middle ear effusion is present.      Nose: Nose normal.      Mouth/Throat:      Mouth: Mucous membranes are moist.      Pharynx: Oropharynx is clear.   Eyes:      Extraocular Movements: Extraocular movements intact.      Conjunctiva/sclera: Conjunctivae normal.      Pupils: Pupils are equal, round, and reactive to light.   Cardiovascular:      Rate and Rhythm: Normal rate and regular rhythm.      Pulses: Normal pulses.      Heart sounds: Normal heart sounds.   Pulmonary:      Effort: Pulmonary effort is normal.      Breath sounds: Normal breath sounds.   Abdominal:      General: Bowel sounds are normal.      Palpations: Abdomen is soft.   Musculoskeletal:         General: Normal range of motion.      Cervical back: Normal range of motion and neck supple.   Skin:     General: Skin is warm and dry.      Capillary Refill: Capillary refill takes less than 2 seconds.   Neurological:      General: No focal deficit present.      Mental Status: She is alert and oriented to person, place, and time.   Psychiatric:         Mood and Affect: Mood normal.         Behavior: Behavior normal.         Thought Content: Thought content normal.         Judgment: Judgment normal.         Procedures           ED Course      /69   Pulse 66   Temp 98 °F (36.7 °C)   Resp 17   Ht 160 cm (63\")   Wt 91.6 kg (201 " lb 15.1 oz)   LMP 09/12/2024 (Approximate)   SpO2 97%   BMI 35.77 kg/m²   Labs Reviewed   BASIC METABOLIC PANEL - Abnormal; Notable for the following components:       Result Value    Glucose 109 (*)     Chloride 108 (*)     CO2 20.8 (*)     All other components within normal limits    Narrative:     GFR Categories in Chronic Kidney Disease (CKD)              GFR Category          GFR (mL/min/1.73)    Interpretation  G1                    90 or greater        Normal or high (1)  G2                    60-89                Mild decrease (1)  G3a                   45-59                Mild to moderate decrease  G3b                   30-44                Moderate to severe decrease  G4                    15-29                Severe decrease  G5                    14 or less           Kidney failure    (1)In the absence of evidence of kidney disease, neither GFR category G1 or G2 fulfill the criteria for CKD.    eGFR calculation 2021 CKD-EPI creatinine equation, which does not include race as a factor   CBC WITH AUTO DIFFERENTIAL - Abnormal; Notable for the following components:    Hemoglobin 9.7 (*)     Hematocrit 32.0 (*)     MCH 25.6 (*)     MCHC 30.3 (*)     RDW 15.5 (*)     Lymphocyte % 19.0 (*)     Monocyte % 4.9 (*)     Immature Grans % 0.9 (*)     Immature Grans, Absolute 0.08 (*)     All other components within normal limits   URINALYSIS W/ CULTURE IF INDICATED - Abnormal; Notable for the following components:    Appearance, UA Cloudy (*)     Ketones, UA Trace (*)     Blood, UA Small (1+) (*)     Leuk Esterase, UA Moderate (2+) (*)     All other components within normal limits    Narrative:     In absence of clinical symptoms, the presence of pyuria, bacteria, and/or nitrites on the urinalysis result does not correlate with infection.   URINALYSIS, MICROSCOPIC ONLY - Abnormal; Notable for the following components:    RBC, UA 3-5 (*)     WBC, UA 11-20 (*)     Bacteria, UA 1+ (*)     Squamous Epithelial Cells,  UA 7-12 (*)     All other components within normal limits   URINE CULTURE   CBC AND DIFFERENTIAL    Narrative:     The following orders were created for panel order CBC & Differential.  Procedure                               Abnormality         Status                     ---------                               -----------         ------                     CBC Auto Differential[[id number]]        Abnormal            Final result                 Please view results for these tests on the individual orders.     Medications   sodium chloride 0.9 % flush 10 mL (has no administration in time range)   diphenhydrAMINE (BENADRYL) injection 25 mg (25 mg Intravenous Given 6/23/25 2145)   prochlorperazine (COMPAZINE) injection 5 mg (5 mg Intravenous Given 6/23/25 2145)   ketorolac (TORADOL) injection 15 mg (15 mg Intravenous Given 6/23/25 2145)   dexAMETHasone (DECADRON) injection 10 mg (10 mg Intravenous Given 6/23/25 2145)   sodium chloride 0.9 % bolus 1,000 mL (0 mL Intravenous Stopped 6/23/25 2309)     CT Head Without Contrast  Result Date: 6/23/2025  Impression: No acute intracranial findings. Electronically Signed: Bayron Abdi MD  6/23/2025 7:59 PM EDT  Workstation ID: [id number]                                                     Medical Decision Making  Problems Addressed:  Other migraine without status migrainosus, not intractable: complicated acute illness or injury    Amount and/or Complexity of Data Reviewed  Labs: ordered.  Radiology: ordered.    Risk  Prescription drug management.    Patient presents to the ED for the above complaint, underwent the above exam and workup.    Imaging reviewed: As reviewed and interpreted by myself and Dr. Novoa, ED attending.  No acute abnormalities.    Reviewed records from labor and delivery from childbirth on 6/19/2025    Differential diagnosis considered: Spinal headache, migraine, sleep deprivation    Patient was treated with the following medications while in the ED;    Medications   sodium chloride 0.9 % flush 10 mL (has no administration in time range)   diphenhydrAMINE (BENADRYL) injection 25 mg (25 mg Intravenous Given 6/23/25 2145)   prochlorperazine (COMPAZINE) injection 5 mg (5 mg Intravenous Given 6/23/25 2145)   ketorolac (TORADOL) injection 15 mg (15 mg Intravenous Given 6/23/25 2145)   dexAMETHasone (DECADRON) injection 10 mg (10 mg Intravenous Given 6/23/25 2145)   sodium chloride 0.9 % bolus 1,000 mL (0 mL Intravenous Stopped 6/23/25 2309)     Upon evaluation of patient after she came in from being returned to the waiting room after the Pit process, she was given a migraine cocktail and a bag of fluids.  Orthostatic vitals show not orthostatic however also did not show any signs of a spinal headache as it did not improve or exacerbate with any positioning or movement.  She reports complete resolution of all discomfort after migraine cocktail.  She does have a history of migraines.  She will be discharged home to follow-up with OB as needed and increase water intake get lots of rest.  Get electrolytes.  This was discussed with her she is agreeable with no further questions.    Consideration was given for admission, but the patient was stable for outpatient management as patient was ambulatory, nontoxic, stable, and afebrile.  Exam as above.    Disposition: Discussed need to follow-up diagnostics, including incidental findings.  Discharged with instructions to obtain outpatient follow-up with patient's symptoms and findings, with strict return precautions if patient develops new or worsening symptoms.    This document is intended for medical expert use only. Reading of this document by patients and/or patient's family without participating medical staff guidance may result in misinterpretation and unintended morbidity.  Any interpretation of such data is the responsibility of the patient and/or family member responsible for the patient in concert with their primary or  specialist providers, not to be left for sources of online searches such as [employer], [employer] or similar queries. Relying on these approaches to knowledge may result in misinterpretation, misguided goals of care and even death should patients or family members try recommendations outside of the realm of professional medical care in a supervised inpatient environment.       Final diagnoses:   Other migraine without status migrainosus, not intractable       ED Disposition  ED Disposition       ED Disposition   Discharge    Condition   Stable    Comment   --               Claudia Hsieh MD  [address] [id number] [phone]               Medication List      No changes were made to your prescriptions during this visit.            Bella Healy, APRN  06/23/25 [id number]

## 2025-06-24 NOTE — DISCHARGE INSTRUCTIONS
Continue taking all previously prescribed medications.  Get plenty of rest, drink plenty of water.      Follow-up with primary care as needed.  Follow-up with OB as needed.  Return to the ER for any new or worsening symptoms.

## 2025-06-25 LAB — BACTERIA SPEC AEROBE CULT: NORMAL

## 2025-06-28 ENCOUNTER — MATERNAL SCREENING (OUTPATIENT)
Dept: CALL CENTER | Facility: HOSPITAL | Age: 20
End: 2025-06-28
Payer: OTHER GOVERNMENT

## 2025-06-28 NOTE — OUTREACH NOTE
Maternal Screening Survey      Flowsheet Row Responses   Facility patient discharged from? [facility]   Attempt successful? No   Unsuccessful attempts Attempt 1              CLEOPATRA PAREDES - Registered Nurse

## 2025-06-28 NOTE — OUTREACH NOTE
Maternal Screening Survey      Flowsheet Row Responses   Facility patient discharged from? [facility]   Attempt successful? No   Unsuccessful attempts Attempt 2              Winter HUMPHREYS - Registered Nurse

## 2025-06-29 ENCOUNTER — MATERNAL SCREENING (OUTPATIENT)
Dept: CALL CENTER | Facility: HOSPITAL | Age: 20
End: 2025-06-29
Payer: OTHER GOVERNMENT

## 2025-06-29 NOTE — OUTREACH NOTE
Maternal Screening Survey      Flowsheet Row Responses   Facility patient discharged from? [facility]   Attempt successful? No   Unsuccessful attempts Attempt 3   Revoke Decline to participate              VERONICA SINHA - Registered Nurse